# Patient Record
Sex: FEMALE | Race: WHITE | NOT HISPANIC OR LATINO | URBAN - METROPOLITAN AREA
[De-identification: names, ages, dates, MRNs, and addresses within clinical notes are randomized per-mention and may not be internally consistent; named-entity substitution may affect disease eponyms.]

---

## 2021-12-03 ENCOUNTER — WEB ENCOUNTER (OUTPATIENT)
Dept: URBAN - METROPOLITAN AREA CLINIC 78 | Facility: CLINIC | Age: 86
End: 2021-12-03

## 2021-12-06 ENCOUNTER — TELEPHONE ENCOUNTER (OUTPATIENT)
Dept: URBAN - METROPOLITAN AREA CLINIC 78 | Facility: CLINIC | Age: 86
End: 2021-12-06

## 2021-12-10 ENCOUNTER — OUT OF OFFICE VISIT (OUTPATIENT)
Dept: URBAN - METROPOLITAN AREA MEDICAL CENTER 10 | Facility: MEDICAL CENTER | Age: 86
End: 2021-12-10
Payer: MEDICARE

## 2021-12-10 DIAGNOSIS — R13.19 CERVICAL DYSPHAGIA: ICD-10-CM

## 2021-12-10 DIAGNOSIS — N17.9 ACUTE INJURY OF KIDNEY: ICD-10-CM

## 2021-12-10 DIAGNOSIS — A41.9 BLOOD INFECTION: ICD-10-CM

## 2021-12-10 DIAGNOSIS — R11.0 AM NAUSEA: ICD-10-CM

## 2021-12-10 PROCEDURE — 99233 SBSQ HOSP IP/OBS HIGH 50: CPT | Performed by: INTERNAL MEDICINE

## 2021-12-11 ENCOUNTER — OUT OF OFFICE VISIT (OUTPATIENT)
Dept: URBAN - METROPOLITAN AREA MEDICAL CENTER 10 | Facility: MEDICAL CENTER | Age: 86
End: 2021-12-11
Payer: MEDICARE

## 2021-12-11 DIAGNOSIS — N17.9 ACUTE INJURY OF KIDNEY: ICD-10-CM

## 2021-12-11 DIAGNOSIS — A41.9 BLOOD INFECTION: ICD-10-CM

## 2021-12-11 DIAGNOSIS — R11.0 AM NAUSEA: ICD-10-CM

## 2021-12-11 DIAGNOSIS — R13.19 CERVICAL DYSPHAGIA: ICD-10-CM

## 2021-12-11 PROCEDURE — 99232 SBSQ HOSP IP/OBS MODERATE 35: CPT | Performed by: INTERNAL MEDICINE

## 2021-12-12 ENCOUNTER — WEB ENCOUNTER (OUTPATIENT)
Dept: URBAN - METROPOLITAN AREA CLINIC 78 | Facility: CLINIC | Age: 86
End: 2021-12-12

## 2021-12-13 ENCOUNTER — OUT OF OFFICE VISIT (OUTPATIENT)
Dept: URBAN - METROPOLITAN AREA MEDICAL CENTER 10 | Facility: MEDICAL CENTER | Age: 86
End: 2021-12-13
Payer: MEDICARE

## 2021-12-13 DIAGNOSIS — K21.9 ACID REFLUX: ICD-10-CM

## 2021-12-13 DIAGNOSIS — J96.01 ACUTE RESPIRATORY FAILURE WITH HYPOXIA: ICD-10-CM

## 2021-12-13 DIAGNOSIS — R13.19 CERVICAL DYSPHAGIA: ICD-10-CM

## 2021-12-13 DIAGNOSIS — R11.0 AM NAUSEA: ICD-10-CM

## 2021-12-13 PROCEDURE — 99232 SBSQ HOSP IP/OBS MODERATE 35: CPT | Performed by: INTERNAL MEDICINE

## 2021-12-17 ENCOUNTER — OFFICE VISIT (OUTPATIENT)
Dept: URBAN - METROPOLITAN AREA CLINIC 78 | Facility: CLINIC | Age: 86
End: 2021-12-17

## 2021-12-20 ENCOUNTER — TELEPHONE ENCOUNTER (OUTPATIENT)
Dept: URBAN - METROPOLITAN AREA CLINIC 78 | Facility: CLINIC | Age: 86
End: 2021-12-20

## 2022-01-31 ENCOUNTER — OFFICE VISIT (OUTPATIENT)
Dept: URBAN - METROPOLITAN AREA CLINIC 78 | Facility: CLINIC | Age: 87
End: 2022-01-31
Payer: MEDICARE

## 2022-01-31 VITALS
TEMPERATURE: 97.1 F | BODY MASS INDEX: 20.7 KG/M2 | HEART RATE: 72 BPM | HEIGHT: 63 IN | SYSTOLIC BLOOD PRESSURE: 147 MMHG | DIASTOLIC BLOOD PRESSURE: 76 MMHG | WEIGHT: 116.8 LBS

## 2022-01-31 DIAGNOSIS — R10.9 ABDOMINAL PAIN: ICD-10-CM

## 2022-01-31 DIAGNOSIS — R53.83 FATIGUE, UNSPECIFIED TYPE: ICD-10-CM

## 2022-01-31 DIAGNOSIS — R43.2 DYSGEUSIA: ICD-10-CM

## 2022-01-31 DIAGNOSIS — R13.12 TRANSFER DYSPHAGIA: ICD-10-CM

## 2022-01-31 DIAGNOSIS — K59.01 CONSTIPATION: ICD-10-CM

## 2022-01-31 DIAGNOSIS — R11.0 NAUSEA: ICD-10-CM

## 2022-01-31 DIAGNOSIS — K27.9 PUD (PEPTIC ULCER DISEASE): ICD-10-CM

## 2022-01-31 DIAGNOSIS — R12 HEARTBURN: ICD-10-CM

## 2022-01-31 PROCEDURE — 99214 OFFICE O/P EST MOD 30 MIN: CPT | Performed by: INTERNAL MEDICINE

## 2022-01-31 RX ORDER — NALOXEGOL OXALATE 25 MG/1
TAKE 1 TABLET (25 MG) BY ORAL ROUTE ONCE DAILY IN THE MORNING TABLET, FILM COATED ORAL 1
Qty: 30 | Refills: 2 | Status: ON HOLD | COMMUNITY
Start: 2019-09-12

## 2022-01-31 RX ORDER — ISOSORBIDE MONONITRATE 30 MG/1
1 TABLET IN THE MORNING TABLET, EXTENDED RELEASE ORAL ONCE A DAY
Status: ACTIVE | COMMUNITY

## 2022-01-31 RX ORDER — CYANOCOBALAMIN (VITAMIN B-12) 500 MCG
1 TABLET TABLET ORAL ONCE A DAY
Status: ACTIVE | COMMUNITY

## 2022-01-31 RX ORDER — DICLOFENAC SODIUM TOPICAL GEL, 1% 10 MG/G
AS DIRECTED GEL TOPICAL
Status: ACTIVE | COMMUNITY

## 2022-01-31 RX ORDER — STANDARDIZED SENNA CONCENTRATE 8.6 MG/1
TAKE 2 TABLETS BY ORAL ROUTE ONCE DAILY TABLET ORAL 1
Qty: 120 | Refills: 3 | Status: ON HOLD | COMMUNITY
Start: 2019-10-25

## 2022-01-31 RX ORDER — PANTOPRAZOLE SODIUM 40 MG/1
1 TABLET TABLET, DELAYED RELEASE ORAL ONCE A DAY
Status: ACTIVE | COMMUNITY

## 2022-01-31 RX ORDER — SACCHAROMYCES BOULARDII 50 MG
1 CAPSULE CAPSULE ORAL TWICE A DAY
Status: ACTIVE | COMMUNITY

## 2022-01-31 RX ORDER — AMLODIPINE BESYLATE 10 MG/1
1 TABLET TABLET ORAL ONCE A DAY
Status: ACTIVE | COMMUNITY

## 2022-01-31 RX ORDER — OXYCODONE HYDROCHLORIDE 5 MG/1
1 TABLET AS NEEDED TABLET ORAL
Status: ACTIVE | COMMUNITY

## 2022-01-31 RX ORDER — ESTRADIOL 0.1 MG/G
AS DIRECTED CREAM VAGINAL
Status: ACTIVE | COMMUNITY

## 2022-01-31 RX ORDER — OMEPRAZOLE 40 MG/1
TAKE 1 CAPSULE (40 MG) BY ORAL ROUTE TWICE DAILY 30 MINUTES BEFORE BREAKFAST AND AGAIN 30 MINUTES BEFORE DINNER CAPSULE, DELAYED RELEASE PELLETS ORAL 1
Qty: 60 | Refills: 4 | Status: ON HOLD | COMMUNITY
Start: 2018-08-17

## 2022-01-31 RX ORDER — PIOGLITAZONE HYDROCHLORIDE 15 MG/1
1 TABLET TABLET ORAL ONCE A DAY
Status: ACTIVE | COMMUNITY

## 2022-01-31 RX ORDER — PROMETHAZINE HYDROCHLORIDE 25 MG/1
TAKE 1 TABLET PO Q 6 HOURS AS NEEDED FOR NAUSEA TABLET ORAL
Qty: 28 | Refills: 3 | Status: ON HOLD | COMMUNITY
Start: 2019-10-25

## 2022-01-31 RX ORDER — METHYLNALTREXONE BROMIDE 150 MG/1
TAKE 3 TABLETS (450 MG) BY ORAL ROUTE ONCE DAILY IN THE MORNING TABLET ORAL 1
Qty: 90 | Refills: 1 | Status: ON HOLD | COMMUNITY
Start: 2019-08-21

## 2022-01-31 RX ORDER — ASPIRIN 81 MG/1
TAKE 1 TABLET (81 MG) BY ORAL ROUTE ONCE DAILY TABLET, COATED ORAL 1
Qty: 0 | Refills: 0 | Status: ON HOLD | COMMUNITY
Start: 1900-01-01

## 2022-01-31 RX ORDER — LEVOTHYROXINE SODIUM 75 UG/1
1 TABLET IN THE MORNING ON AN EMPTY STOMACH TABLET ORAL ONCE A DAY
Status: ACTIVE | COMMUNITY

## 2022-01-31 NOTE — HPI-TODAY'S VISIT:
The patient presents for a gastroenterology evaluation following a recent inpt hospital stay. A copy of this note will be sent to the referring physician.  She was recently hospitalized in Dec 2021 for intractable nausea for weeks, BRIGITTE, sepsis on Meropenem and acute respiratory failure with possible pneumonia. She was noted to have transfer dysphagia with evidence of silent aspiration with all consistencies, Dobhoff was placed to administer TFs. She also has chronic back pain/R hip pain radiating to RLQ, thrombocytopenia, GERD, NIDDM, CKD 3, Carotid artery disease, HTN and HLD  A conservartive approach was taken from a GI standpoint. I started her on Scopolamine patch and provided time until all other acute issues imrpoved. She has in fact done better.   She tells me today she has been compliant with using the  PPI BID. Her son wonders if the Pantoprazole could be contributing to some of her ongoing nausea and lightheadedness. She was taking Zofran 8mg  SL; her ENT stopped the Meclizine. She is instead taking a sip of the Children's Benadryl for such.  She drinks OJ every day.  She is not using the Scopolamine or Reglan anymore.  Her appetite has been slowly improving. She reports some dysgeusia. She states her food tastes like medicine. Her son also started her recently on a MVI.  She is using a stool softener and Miralax once a day. She is having a soft, formed BM daily.   She still complains of intermittent, moderate abdominal pain on the RLQ, which is likely related to her R hip pain.  She is doing PT about twice a week.   She was seen by Dr. Phipps a couple of weeks ago and had labs.  She was noted to have markedly low TSH levels. Her thyroxine drug was adjusted.   She is still having constipation. She is now on Norco 5/325 (as opposed to 10/325) on a prn basis for severe back pain.  The patient underwent EGD with dilation by Dr. Lino Rod in 2011. UGI 12/15 for similar complains showed an irregular narrowing at GE junction (?stricture), a small sliding HH and diffuse tertiary esophageal contractions. She reports a remote history of PUD (DU).   Her  passed away from colon cancer. Her son, who is in his 60's has refused to have a colonoscopy done.  She has had 3 back surgeries by Dr. Amanda Pugh. She also has LE neuropathy.  She is accompanied by her son today.   Summary of prior workup:  - Labs on 1/18/22: sodium 138, potassium 4.5, glucose 114, BUN 36, creatinine 1.27, total protein 7.2, albumin 4.2, total bilirubin 0.6, ALT 8, AST 15, alkaline phosphatase 58. TSH 0.08. Free thyroxine 1.81. White blood cell count 9.2, hemoglobin 12.7, MCV 93, platelets 240. - Chest x-ray, portable view, done on admission showing small left pleural effusion with associated atelectasis with no dense focal airspace opacity. - KUB on 12/06/2021, showing nonspecific nonobstructive bowel gas pattern. - Bilateral renal ultrasound with the bladder done on 12/06 showingldly increased renal echogenicity with no hydronephrosis. - CT scan of the abdomen and pelvis without IV contrast done on 12/06 showing gallbladder distended with small amount of sludge with no stones or gross wall thickening.  New small bilateral pleural effusion with patchy infiltrate or atelectasis at the lung bases with no focal fluid collection in the abdomen and pelvis. - CXR on 12/06/2021: new bilateral interstitial opacities favoring interstitial mild pulmonary edema or atypical infection with stable trace pleural effusion bilaterally and atelectasis. - CT scan of abdomen without IV contrast  on 12/08: no  acute abnormality with stable asymmetric atrophy of the left kidney and no hydronephrosis. 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 - MRI of the right hip without contrast on 12/09 showing right gluteus  and minimus insertion tendinopathy with mild edema along the abductor brevis, abductor longus, abductor externus muscle that is nonspecific and probably reflecting mild muscle strain.  - CT A/P 10/17/19: Very subtle wall thickening at the level of the cecum with minimal pericolonic inflammatory change. Short segment colitis or uncomplicated diverticulitis given adjacent diverticulum. No surrounding fluid or air. No hydronephrosis, hydroureter, or renal calculi. No bowel obstruction or dilatation.  - CT abdomen and pelvis w/o contrast on 10/25/18 revealed fecal retention without intestinal obstruction. No - CT evidence of appendicitis. Cystic structure in the right kidney which likely represents a cyst but incompletely characterized. Shrunken left kidney. No urinary obstruction. Small hiatal hernia with evidence of gastroesophageal reflux. No pericardial effusion. Normal liver, spleen, pancreas, adrenal glands and intestines. No large colonic mass. Diverticulosis.  - Colonoscopy on 8/29/18 showed a 4 mm ascending colon TA and sigm diverticulosis; otherwise normal TI and colon mucosa. - E/C by me on 7/12/18 revealed a tortuous esoph with Grade B reflux esophagitis, mild gastritis and a normal duodenum. Her colonoscopy had to be aborted as there was solid stool in the rectum. - Pelvic US on 7/3/18: Status post total hysterectomy. No obvious pelvic or adnexal mass on limited transabdominalevaluation.  - Abd US on 7/3/18: Mildly heterogeneous hepatic echogenicity, consistent with nonspecific hepatic parenchymal changes, including hepatic steatosis. Increased renal echogenicity, consistent with diffuse renal parenchymal disease, with asymmetric left renal cortical atrophy. No hydronephrosis. No sonographic evidence for cholelithiasis or acute cholecystitis.  - Labs on 7/3/18 showed a normal CBC, LFT's, TSH. Vit D level.  - Pharmacologic nuclear stress test in Feb 2018: Perfusion: Small, mild, paradoxic apical defect. This likely represents artifact. Systolic Function: Normal left ventricular systolic function and wall motion with an EF of 79%. Appropriate Use Criteria for Revascularization: Low-risk

## 2022-02-10 ENCOUNTER — TELEPHONE ENCOUNTER (OUTPATIENT)
Dept: URBAN - METROPOLITAN AREA CLINIC 78 | Facility: CLINIC | Age: 87
End: 2022-02-10

## 2022-02-15 ENCOUNTER — TELEPHONE ENCOUNTER (OUTPATIENT)
Dept: URBAN - METROPOLITAN AREA CLINIC 78 | Facility: CLINIC | Age: 87
End: 2022-02-15

## 2022-02-15 RX ORDER — PANTOPRAZOLE SODIUM 40 MG/1
1 TABLET TABLET, DELAYED RELEASE ORAL ONCE A DAY
Qty: 90 TABLET | Refills: 1 | OUTPATIENT
Start: 2022-02-15

## 2022-02-22 ENCOUNTER — TELEPHONE ENCOUNTER (OUTPATIENT)
Dept: URBAN - METROPOLITAN AREA CLINIC 78 | Facility: CLINIC | Age: 87
End: 2022-02-22

## 2022-02-22 RX ORDER — PANTOPRAZOLE SODIUM 40 MG/1
1 TABLET TABLET, DELAYED RELEASE ORAL ONCE A DAY
Qty: 90 TABLET | Refills: 1 | OUTPATIENT
Start: 2022-02-22

## 2022-03-07 ENCOUNTER — OFFICE VISIT (OUTPATIENT)
Dept: URBAN - METROPOLITAN AREA CLINIC 78 | Facility: CLINIC | Age: 87
End: 2022-03-07
Payer: MEDICARE

## 2022-03-07 VITALS
DIASTOLIC BLOOD PRESSURE: 74 MMHG | HEIGHT: 63 IN | TEMPERATURE: 96.8 F | SYSTOLIC BLOOD PRESSURE: 176 MMHG | HEART RATE: 58 BPM

## 2022-03-07 DIAGNOSIS — R43.2 DYSGEUSIA: ICD-10-CM

## 2022-03-07 DIAGNOSIS — K27.9 PUD (PEPTIC ULCER DISEASE): ICD-10-CM

## 2022-03-07 DIAGNOSIS — R10.9 ABDOMINAL PAIN: ICD-10-CM

## 2022-03-07 DIAGNOSIS — R13.12 TRANSFER DYSPHAGIA: ICD-10-CM

## 2022-03-07 DIAGNOSIS — R12 HEARTBURN: ICD-10-CM

## 2022-03-07 DIAGNOSIS — R53.83 FATIGUE, UNSPECIFIED TYPE: ICD-10-CM

## 2022-03-07 DIAGNOSIS — R11.0 NAUSEA: ICD-10-CM

## 2022-03-07 DIAGNOSIS — K59.01 CONSTIPATION: ICD-10-CM

## 2022-03-07 PROCEDURE — 99214 OFFICE O/P EST MOD 30 MIN: CPT | Performed by: INTERNAL MEDICINE

## 2022-03-07 RX ORDER — DICLOFENAC SODIUM TOPICAL GEL, 1% 10 MG/G
AS DIRECTED GEL TOPICAL
Status: ACTIVE | COMMUNITY

## 2022-03-07 RX ORDER — AMLODIPINE BESYLATE 10 MG/1
1 TABLET TABLET ORAL ONCE A DAY
Status: ACTIVE | COMMUNITY

## 2022-03-07 RX ORDER — LEVOTHYROXINE SODIUM 75 UG/1
1 TABLET IN THE MORNING ON AN EMPTY STOMACH TABLET ORAL ONCE A DAY
Status: ACTIVE | COMMUNITY

## 2022-03-07 RX ORDER — PANTOPRAZOLE SODIUM 40 MG/1
1 TABLET TABLET, DELAYED RELEASE ORAL ONCE A DAY
Qty: 90 TABLET | Refills: 1 | Status: ACTIVE | COMMUNITY
Start: 2022-02-22

## 2022-03-07 RX ORDER — ONDANSETRON 8 MG/1
1 TABLET ON THE TONGUE AND ALLOW TO DISSOLVE  AS NEEDED TABLET, ORALLY DISINTEGRATING ORAL
Qty: 60 | Refills: 2 | OUTPATIENT

## 2022-03-07 RX ORDER — PROMETHAZINE HYDROCHLORIDE 25 MG/1
TAKE 1 TABLET PO Q 6 HOURS AS NEEDED FOR NAUSEA TABLET ORAL
Qty: 28 | Refills: 3 | Status: DISCONTINUED | COMMUNITY
Start: 2019-10-25

## 2022-03-07 RX ORDER — ESTRADIOL 0.1 MG/G
AS DIRECTED CREAM VAGINAL
Status: ACTIVE | COMMUNITY

## 2022-03-07 RX ORDER — METHYLNALTREXONE BROMIDE 150 MG/1
TAKE 3 TABLETS (450 MG) BY ORAL ROUTE ONCE DAILY IN THE MORNING TABLET ORAL 1
Qty: 90 | Refills: 1 | Status: DISCONTINUED | COMMUNITY
Start: 2019-08-21

## 2022-03-07 RX ORDER — ASPIRIN 81 MG/1
TAKE 1 TABLET (81 MG) BY ORAL ROUTE ONCE DAILY TABLET, COATED ORAL 1
Qty: 0 | Refills: 0 | Status: DISCONTINUED | COMMUNITY
Start: 1900-01-01

## 2022-03-07 RX ORDER — PANTOPRAZOLE SODIUM 40 MG/1
1 TABLET TABLET, DELAYED RELEASE ORAL ONCE A DAY
Qty: 90 TABLET | Refills: 1 | Status: ACTIVE | COMMUNITY
Start: 2022-02-15

## 2022-03-07 RX ORDER — NALOXEGOL OXALATE 25 MG/1
TAKE 1 TABLET (25 MG) BY ORAL ROUTE ONCE DAILY IN THE MORNING TABLET, FILM COATED ORAL 1
Qty: 30 | Refills: 2 | Status: DISCONTINUED | COMMUNITY
Start: 2019-09-12

## 2022-03-07 RX ORDER — OXYCODONE HYDROCHLORIDE 5 MG/1
1 TABLET AS NEEDED TABLET ORAL
Status: ACTIVE | COMMUNITY

## 2022-03-07 RX ORDER — PANTOPRAZOLE SODIUM 40 MG/1
1 TABLET TABLET, DELAYED RELEASE ORAL ONCE A DAY
Qty: 90 TABLET | Refills: 2

## 2022-03-07 RX ORDER — STANDARDIZED SENNA CONCENTRATE 8.6 MG/1
TAKE 2 TABLETS BY ORAL ROUTE ONCE DAILY TABLET ORAL 1
Qty: 120 | Refills: 3 | Status: DISCONTINUED | COMMUNITY
Start: 2019-10-25

## 2022-03-07 RX ORDER — PIOGLITAZONE HYDROCHLORIDE 15 MG/1
1 TABLET TABLET ORAL ONCE A DAY
Status: ACTIVE | COMMUNITY

## 2022-03-07 RX ORDER — PANTOPRAZOLE SODIUM 40 MG/1
1 TABLET TABLET, DELAYED RELEASE ORAL ONCE A DAY
Status: ACTIVE | COMMUNITY

## 2022-03-07 RX ORDER — CYANOCOBALAMIN (VITAMIN B-12) 500 MCG
1 TABLET TABLET ORAL ONCE A DAY
Status: ACTIVE | COMMUNITY

## 2022-03-07 RX ORDER — ISOSORBIDE MONONITRATE 30 MG/1
1 TABLET IN THE MORNING TABLET, EXTENDED RELEASE ORAL ONCE A DAY
Status: ACTIVE | COMMUNITY

## 2022-03-07 RX ORDER — SACCHAROMYCES BOULARDII 50 MG
1 CAPSULE CAPSULE ORAL TWICE A DAY
Status: ACTIVE | COMMUNITY

## 2022-03-07 RX ORDER — OMEPRAZOLE 40 MG/1
TAKE 1 CAPSULE (40 MG) BY ORAL ROUTE TWICE DAILY 30 MINUTES BEFORE BREAKFAST AND AGAIN 30 MINUTES BEFORE DINNER CAPSULE, DELAYED RELEASE PELLETS ORAL 1
Qty: 60 | Refills: 4 | Status: DISCONTINUED | COMMUNITY
Start: 2018-08-17

## 2022-03-07 NOTE — HPI-TODAY'S VISIT:
The patient presents for a gastroenterology evaluation following a recent inpt hospital stay. A copy of this note will be sent to the referring physician.  She was recently hospitalized in Dec 2021 for intractable nausea for weeks, BRIGITTE, sepsis on Meropenem and acute respiratory failure with possible pneumonia. She was noted to have transfer dysphagia with evidence of silent aspiration with all consistencies, Dobhoff was placed to administer TFs. She also has chronic back pain/R hip pain radiating to RLQ, thrombocytopenia, GERD, NIDDM, CKD 3, Carotid artery disease, HTN and HLD  A conservartive approach was taken from a GI standpoint. I started her on Scopolamine patch and provided time until all other acute issues imrpoved. She has in fact done better.   She tells me today she has done quite well despite cutting back on the PPI to QD.  She was taking Zofran 8mg  SL; her ENT stopped the Meclizine. She is instead taking a sip of the Children's Benadryl for such.   She is not using the Scopolamine or Reglan anymore.  Her appetite has been slowly improving. She reports less dysgeusia than at her previous visit.  She is almost done with OT. She still needs additional PT.   When she gets nauseous and takes the Zofran she does have mildly increased burping.   She is using a stool softeners and Miralax once a day. She is having a soft, formed BM daily.   She still complains of intermittent, moderate abdominal pain on the RLQ, which is likely related to her R hip pain.  She is doing PT about twice a week.   She was seen by Dr. Phipps a couple of weeks ago and had labs.  She was noted to have markedly low TSH levels. Her thyroxine drug was adjusted.   She is still having constipation. She is taking the Miralax and having a BM almost daily.   The patient underwent EGD with dilation by Dr. iLno Rod in 2011. UGI 12/15 for similar complains showed an irregular narrowing at GE junction (?stricture), a small sliding HH and diffuse tertiary esophageal contractions. She reports a remote history of PUD (DU).  She has good days and bad days. Some days she has severe peripheral neuropathy.   She has been taking 2 Florastor 2/day. They are wondering whether she can cut back to once day.  Her  passed away from colon cancer. Her son, who is in his 60's has refused to have a colonoscopy done.  She has had 3 back surgeries by Dr. Amanda Pugh. She also has LE neuropathy.  She is accompanied by her son today.   Summary of prior workup:  - Labs on 1/18/22: sodium 138, potassium 4.5, glucose 114, BUN 36, creatinine 1.27, total protein 7.2, albumin 4.2, total bilirubin 0.6, ALT 8, AST 15, alkaline phosphatase 58. TSH 0.08. Free thyroxine 1.81. White blood cell count 9.2, hemoglobin 12.7, MCV 93, platelets 240. - Chest x-ray, portable view, done on admission showing small left pleural effusion with associated atelectasis with no dense focal airspace opacity. - KUB on 12/06/2021, showing nonspecific nonobstructive bowel gas pattern. - Bilateral renal ultrasound with the bladder done on 12/06 showingldly increased renal echogenicity with no hydronephrosis. - CT scan of the abdomen and pelvis without IV contrast done on 12/06 showing gallbladder distended with small amount of sludge with no stones or gross wall thickening.  New small bilateral pleural effusion with patchy infiltrate or atelectasis at the lung bases with no focal fluid collection in the abdomen and pelvis. - CXR on 12/06/2021: new bilateral interstitial opacities favoring interstitial mild pulmonary edema or atypical infection with stable trace pleural effusion bilaterally and atelectasis. - CT scan of abdomen without IV contrast  on 12/08: no  acute abnormality with stable asymmetric atrophy of the left kidney and no hydronephrosis. 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 - MRI of the right hip without contrast on 12/09 showing right gluteus  and minimus insertion tendinopathy with mild edema along the abductor brevis, abductor longus, abductor externus muscle that is nonspecific and probably reflecting mild muscle strain.  - CT A/P 10/17/19: Very subtle wall thickening at the level of the cecum with minimal pericolonic inflammatory change. Short segment colitis or uncomplicated diverticulitis given adjacent diverticulum. No surrounding fluid or air. No hydronephrosis, hydroureter, or renal calculi. No bowel obstruction or dilatation.  - CT abdomen and pelvis w/o contrast on 10/25/18 revealed fecal retention without intestinal obstruction. No - CT evidence of appendicitis. Cystic structure in the right kidney which likely represents a cyst but incompletely characterized. Shrunken left kidney. No urinary obstruction. Small hiatal hernia with evidence of gastroesophageal reflux. No pericardial effusion. Normal liver, spleen, pancreas, adrenal glands and intestines. No large colonic mass. Diverticulosis.  - Colonoscopy on 8/29/18 showed a 4 mm ascending colon TA and sigm diverticulosis; otherwise normal TI and colon mucosa. - E/C by me on 7/12/18 revealed a tortuous esoph with Grade B reflux esophagitis, mild gastritis and a normal duodenum. Her colonoscopy had to be aborted as there was solid stool in the rectum. - Pelvic US on 7/3/18: Status post total hysterectomy. No obvious pelvic or adnexal mass on limited transabdominalevaluation.  - Abd US on 7/3/18: Mildly heterogeneous hepatic echogenicity, consistent with nonspecific hepatic parenchymal changes, including hepatic steatosis. Increased renal echogenicity, consistent with diffuse renal parenchymal disease, with asymmetric left renal cortical atrophy. No hydronephrosis. No sonographic evidence for cholelithiasis or acute cholecystitis.  - Labs on 7/3/18 showed a normal CBC, LFT's, TSH. Vit D level.  - Pharmacologic nuclear stress test in Feb 2018: Perfusion: Small, mild, paradoxic apical defect. This likely represents artifact. Systolic Function: Normal left ventricular systolic function and wall motion with an EF of 79%. Appropriate Use Criteria for Revascularization: Low-risk

## 2022-07-07 ENCOUNTER — OFFICE VISIT (OUTPATIENT)
Dept: URBAN - METROPOLITAN AREA CLINIC 78 | Facility: CLINIC | Age: 87
End: 2022-07-07
Payer: MEDICARE

## 2022-07-07 VITALS
DIASTOLIC BLOOD PRESSURE: 82 MMHG | HEIGHT: 63 IN | TEMPERATURE: 97.8 F | HEART RATE: 81 BPM | SYSTOLIC BLOOD PRESSURE: 168 MMHG

## 2022-07-07 DIAGNOSIS — K27.9 PUD (PEPTIC ULCER DISEASE): ICD-10-CM

## 2022-07-07 DIAGNOSIS — K59.01 CONSTIPATION: ICD-10-CM

## 2022-07-07 DIAGNOSIS — R13.12 TRANSFER DYSPHAGIA: ICD-10-CM

## 2022-07-07 DIAGNOSIS — R43.2 DYSGEUSIA: ICD-10-CM

## 2022-07-07 DIAGNOSIS — R11.0 NAUSEA: ICD-10-CM

## 2022-07-07 DIAGNOSIS — R10.9 ABDOMINAL PAIN: ICD-10-CM

## 2022-07-07 DIAGNOSIS — R53.83 FATIGUE, UNSPECIFIED TYPE: ICD-10-CM

## 2022-07-07 DIAGNOSIS — R23.2 HOT FLASHES: ICD-10-CM

## 2022-07-07 DIAGNOSIS — R12 HEARTBURN: ICD-10-CM

## 2022-07-07 PROCEDURE — 99214 OFFICE O/P EST MOD 30 MIN: CPT | Performed by: INTERNAL MEDICINE

## 2022-07-07 RX ORDER — BUPRENORPHINE 7.5 UG/H
PATCH, EXTENDED RELEASE TRANSDERMAL
Qty: 4 PATCH | Status: ACTIVE | COMMUNITY

## 2022-07-07 RX ORDER — MIRABEGRON 50 MG/1
TABLET, FILM COATED, EXTENDED RELEASE ORAL
Qty: 90 TABLET | Status: ACTIVE | COMMUNITY

## 2022-07-07 RX ORDER — DICLOFENAC SODIUM TOPICAL GEL, 1% 10 MG/G
AS DIRECTED GEL TOPICAL
Status: ACTIVE | COMMUNITY

## 2022-07-07 RX ORDER — DOXYCYCLINE HYCLATE 100 MG/1
CAPSULE ORAL
Qty: 60 CAPSULE | Status: ACTIVE | COMMUNITY

## 2022-07-07 RX ORDER — PANTOPRAZOLE SODIUM 40 MG/1
1 TABLET TABLET, DELAYED RELEASE ORAL ONCE A DAY
Qty: 90 TABLET | Refills: 2 | Status: ACTIVE | COMMUNITY

## 2022-07-07 RX ORDER — ONDANSETRON 8 MG/1
1 TABLET ON THE TONGUE AND ALLOW TO DISSOLVE  AS NEEDED TABLET, ORALLY DISINTEGRATING ORAL
Qty: 60 | Refills: 2 | Status: ACTIVE | COMMUNITY

## 2022-07-07 RX ORDER — LEVOTHYROXINE SODIUM 75 UG/1
1 TABLET IN THE MORNING ON AN EMPTY STOMACH TABLET ORAL ONCE A DAY
Status: ACTIVE | COMMUNITY

## 2022-07-07 RX ORDER — ONDANSETRON 8 MG/1
1 TABLET ON THE TONGUE AND ALLOW TO DISSOLVE  AS NEEDED TABLET, ORALLY DISINTEGRATING ORAL
Qty: 60 | Refills: 2 | OUTPATIENT

## 2022-07-07 RX ORDER — TRETINOIN 1 MG/G
CREAM TOPICAL
Qty: 45 GRAM | Status: ACTIVE | COMMUNITY

## 2022-07-07 RX ORDER — SACCHAROMYCES BOULARDII 50 MG
1 CAPSULE CAPSULE ORAL TWICE A DAY
Status: ACTIVE | COMMUNITY

## 2022-07-07 RX ORDER — AMLODIPINE BESYLATE 10 MG/1
1 TABLET TABLET ORAL ONCE A DAY
Status: ACTIVE | COMMUNITY

## 2022-07-07 RX ORDER — CALCIPOTRIENE 0.05 MG/ML
SOLUTION TOPICAL
Qty: 60 UNSPECIFIED | Status: ACTIVE | COMMUNITY

## 2022-07-07 RX ORDER — CYANOCOBALAMIN (VITAMIN B-12) 500 MCG
1 TABLET TABLET ORAL ONCE A DAY
Status: ACTIVE | COMMUNITY

## 2022-07-07 RX ORDER — OXYCODONE HYDROCHLORIDE 5 MG/1
1 TABLET AS NEEDED TABLET ORAL
Status: ACTIVE | COMMUNITY

## 2022-07-07 RX ORDER — METOPROLOL SUCCINATE 25 MG/1
TABLET, EXTENDED RELEASE ORAL
Qty: 90 TABLET | Status: ACTIVE | COMMUNITY

## 2022-07-07 RX ORDER — ESTRADIOL 0.1 MG/G
AS DIRECTED CREAM VAGINAL
Status: ACTIVE | COMMUNITY

## 2022-07-07 RX ORDER — ATORVASTATIN CALCIUM 20 MG/1
TABLET, FILM COATED ORAL
Qty: 90 TABLET | Status: ACTIVE | COMMUNITY

## 2022-07-07 RX ORDER — ISOSORBIDE MONONITRATE 30 MG/1
1 TABLET IN THE MORNING TABLET, EXTENDED RELEASE ORAL ONCE A DAY
Status: ACTIVE | COMMUNITY

## 2022-07-07 RX ORDER — LEVOTHYROXINE SODIUM 100 UG/1
TABLET ORAL
Qty: 90 TABLET | Status: ACTIVE | COMMUNITY

## 2022-07-07 RX ORDER — PIOGLITAZONE HYDROCHLORIDE 15 MG/1
1 TABLET TABLET ORAL ONCE A DAY
Status: ACTIVE | COMMUNITY

## 2022-07-07 RX ORDER — OMEGA-3-ACID ETHYL ESTERS 1 G/1
CAPSULE, LIQUID FILLED ORAL
Qty: 360 CAPSULE | Status: ACTIVE | COMMUNITY

## 2022-07-07 RX ORDER — LEVOTHYROXINE SODIUM 88 UG/1
TABLET ORAL
Qty: 90 TABLET | Status: ACTIVE | COMMUNITY

## 2022-07-07 RX ORDER — KETOCONAZOLE 20 MG/ML
SHAMPOO, SUSPENSION TOPICAL
Qty: 120 UNSPECIFIED | Status: ACTIVE | COMMUNITY

## 2022-07-07 RX ORDER — FLUOCINONIDE 0.5 MG/ML
SOLUTION TOPICAL
Qty: 60 UNSPECIFIED | Status: ACTIVE | COMMUNITY

## 2022-07-07 NOTE — HPI-TODAY'S VISIT:
The patient presents for a gastroenterology evaluation following a recent inpt hospital stay. A copy of this note will be sent to the referring physician.  She was recently hospitalized in Dec 2021 for intractable nausea for weeks, BRIGITTE, sepsis on Meropenem and acute respiratory failure with possible pneumonia. She was noted to have transfer dysphagia with evidence of silent aspiration with all consistencies, Dobhoff was placed to administer TFs. She also has chronic back pain/R hip pain radiating to RLQ, thrombocytopenia, GERD, NIDDM, CKD 3, Carotid artery disease, HTN and HLD  A conservartive approach was taken from a GI standpoint. I started her on Scopolamine patch and provided time until all other acute issues imrpoved. She has in fact done better.  She is not using the Scopolamine or Reglan anymore.   She has been havin hot flashes, for whcih reason Dr. Ramy Acosta switched her to Levoxyl 100mcg daily. The hot flashes have gotten a bit better. The nausea is not as bad as  it was before. She might experience the nausea sporadically now, especially if she doesn't take her meds with foods.  The RLQ pain has gotten a bit better, but she still experiences it here and there. She is to get an injection in her SI joint next week. She is using the Miralax daily as well as Docusate QOD, which has been helping with her constipation. She has a lot of pain in her legs related to her diabetic neuropathy. She is on a B complex cocktail. She has been compliant with using the Protonix daily. Her appetite has been slowly improving. She reports less dysgeusia  She does not need new prescritpions right now.   The patient underwent EGD with dilation by Dr. Lino Rod in 2011. UGI 12/15 for similar complains showed an irregular narrowing at GE junction (?stricture), a small sliding HH and diffuse tertiary esophageal contractions. She reports a remote history of PUD (DU).  She has good days and bad days. Some days she has severe peripheral neuropathy.   She has been taking 2 Florastor 2/day. They are wondering whether she can cut back to once day.  Her  passed away from colon cancer. Her son, who is in his 60's has refused to have a colonoscopy done.  She has had 3 back surgeries by Dr. Amanda Pugh.   She is accompanied by her son today.   Summary of prior workup:  - Labs on 1/18/22: sodium 138, potassium 4.5, glucose 114, BUN 36, creatinine 1.27, total protein 7.2, albumin 4.2, total bilirubin 0.6, ALT 8, AST 15, alkaline phosphatase 58. TSH 0.08. Free thyroxine 1.81. White blood cell count 9.2, hemoglobin 12.7, MCV 93, platelets 240. - Chest x-ray, portable view, done on admission showing small left pleural effusion with associated atelectasis with no dense focal airspace opacity. - KUB on 12/06/2021, showing nonspecific nonobstructive bowel gas pattern. - Bilateral renal ultrasound with the bladder done on 12/06 showingldly increased renal echogenicity with no hydronephrosis. - CT scan of the abdomen and pelvis without IV contrast done on 12/06 showing gallbladder distended with small amount of sludge with no stones or gross wall thickening.  New small bilateral pleural effusion with patchy infiltrate or atelectasis at the lung bases with no focal fluid collection in the abdomen and pelvis. - CXR on 12/06/2021: new bilateral interstitial opacities favoring interstitial mild pulmonary edema or atypical infection with stable trace pleural effusion bilaterally and atelectasis. - CT scan of abdomen without IV contrast  on 12/08: no  acute abnormality with stable asymmetric atrophy of the left kidney and no hydronephrosis. 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 - MRI of the right hip without contrast on 12/09 showing right gluteus  and minimus insertion tendinopathy with mild edema along the abductor brevis, abductor longus, abductor externus muscle that is nonspecific and probably reflecting mild muscle strain.  - CT A/P 10/17/19: Very subtle wall thickening at the level of the cecum with minimal pericolonic inflammatory change. Short segment colitis or uncomplicated diverticulitis given adjacent diverticulum. No surrounding fluid or air. No hydronephrosis, hydroureter, or renal calculi. No bowel obstruction or dilatation.  - CT abdomen and pelvis w/o contrast on 10/25/18 revealed fecal retention without intestinal obstruction. No - CT evidence of appendicitis. Cystic structure in the right kidney which likely represents a cyst but incompletely characterized. Shrunken left kidney. No urinary obstruction. Small hiatal hernia with evidence of gastroesophageal reflux. No pericardial effusion. Normal liver, spleen, pancreas, adrenal glands and intestines. No large colonic mass. Diverticulosis.  - Colonoscopy on 8/29/18 showed a 4 mm ascending colon TA and sigm diverticulosis; otherwise normal TI and colon mucosa. - E/C by me on 7/12/18 revealed a tortuous esoph with Grade B reflux esophagitis, mild gastritis and a normal duodenum. Her colonoscopy had to be aborted as there was solid stool in the rectum. - Pelvic US on 7/3/18: Status post total hysterectomy. No obvious pelvic or adnexal mass on limited transabdominalevaluation.  - Abd US on 7/3/18: Mildly heterogeneous hepatic echogenicity, consistent with nonspecific hepatic parenchymal changes, including hepatic steatosis. Increased renal echogenicity, consistent with diffuse renal parenchymal disease, with asymmetric left renal cortical atrophy. No hydronephrosis. No sonographic evidence for cholelithiasis or acute cholecystitis.  - Labs on 7/3/18 showed a normal CBC, LFT's, TSH. Vit D level.  - Pharmacologic nuclear stress test in Feb 2018: Perfusion: Small, mild, paradoxic apical defect. This likely represents artifact. Systolic Function: Normal left ventricular systolic function and wall motion with an EF of 79%. Appropriate Use Criteria for Revascularization: Low-risk

## 2022-11-04 ENCOUNTER — WEB ENCOUNTER (OUTPATIENT)
Dept: URBAN - METROPOLITAN AREA CLINIC 78 | Facility: CLINIC | Age: 87
End: 2022-11-04

## 2022-11-10 ENCOUNTER — OFFICE VISIT (OUTPATIENT)
Dept: URBAN - METROPOLITAN AREA CLINIC 78 | Facility: CLINIC | Age: 87
End: 2022-11-10
Payer: MEDICARE

## 2022-11-10 VITALS
BODY MASS INDEX: 21.26 KG/M2 | HEIGHT: 63 IN | HEART RATE: 59 BPM | TEMPERATURE: 97.8 F | WEIGHT: 120 LBS | SYSTOLIC BLOOD PRESSURE: 174 MMHG | DIASTOLIC BLOOD PRESSURE: 75 MMHG

## 2022-11-10 DIAGNOSIS — K59.01 CONSTIPATION: ICD-10-CM

## 2022-11-10 DIAGNOSIS — R11.0 NAUSEA: ICD-10-CM

## 2022-11-10 DIAGNOSIS — R13.12 TRANSFER DYSPHAGIA: ICD-10-CM

## 2022-11-10 DIAGNOSIS — R12 HEARTBURN: ICD-10-CM

## 2022-11-10 DIAGNOSIS — R10.9 ABDOMINAL PAIN: ICD-10-CM

## 2022-11-10 DIAGNOSIS — R53.83 FATIGUE, UNSPECIFIED TYPE: ICD-10-CM

## 2022-11-10 DIAGNOSIS — R23.2 HOT FLASHES: ICD-10-CM

## 2022-11-10 DIAGNOSIS — R43.2 DYSGEUSIA: ICD-10-CM

## 2022-11-10 DIAGNOSIS — K27.9 PUD (PEPTIC ULCER DISEASE): ICD-10-CM

## 2022-11-10 PROBLEM — 14760008 CONSTIPATION: Status: ACTIVE | Noted: 2022-01-31

## 2022-11-10 PROBLEM — 271801002: Status: ACTIVE | Noted: 2022-01-31

## 2022-11-10 PROBLEM — 13200003 PEPTIC ULCER DISEASE: Status: ACTIVE | Noted: 2022-01-31

## 2022-11-10 PROBLEM — 71457002: Status: ACTIVE | Noted: 2021-12-20

## 2022-11-10 PROCEDURE — 99214 OFFICE O/P EST MOD 30 MIN: CPT | Performed by: INTERNAL MEDICINE

## 2022-11-10 RX ORDER — DOXYCYCLINE HYCLATE 100 MG/1
CAPSULE ORAL
Qty: 60 CAPSULE | Status: ACTIVE | COMMUNITY

## 2022-11-10 RX ORDER — PIOGLITAZONE HYDROCHLORIDE 15 MG/1
1 TABLET TABLET ORAL ONCE A DAY
Status: ACTIVE | COMMUNITY

## 2022-11-10 RX ORDER — DICLOFENAC SODIUM TOPICAL GEL, 1% 10 MG/G
AS DIRECTED GEL TOPICAL
Status: ACTIVE | COMMUNITY

## 2022-11-10 RX ORDER — MIRABEGRON 50 MG/1
TABLET, FILM COATED, EXTENDED RELEASE ORAL
Qty: 90 TABLET | Status: ACTIVE | COMMUNITY

## 2022-11-10 RX ORDER — ONDANSETRON 8 MG/1
1 TABLET ON THE TONGUE AND ALLOW TO DISSOLVE  AS NEEDED TABLET, ORALLY DISINTEGRATING ORAL
Qty: 60 | Refills: 2 | Status: ACTIVE | COMMUNITY

## 2022-11-10 RX ORDER — CALCIPOTRIENE 0.05 MG/ML
SOLUTION TOPICAL
Qty: 60 UNSPECIFIED | Status: ACTIVE | COMMUNITY

## 2022-11-10 RX ORDER — SACCHAROMYCES BOULARDII 50 MG
1 CAPSULE CAPSULE ORAL TWICE A DAY
Status: ACTIVE | COMMUNITY

## 2022-11-10 RX ORDER — KETOCONAZOLE 20 MG/ML
SHAMPOO, SUSPENSION TOPICAL
Qty: 120 UNSPECIFIED | Status: ACTIVE | COMMUNITY

## 2022-11-10 RX ORDER — OXYCODONE HYDROCHLORIDE 5 MG/1
1 TABLET AS NEEDED TABLET ORAL
Status: ACTIVE | COMMUNITY

## 2022-11-10 RX ORDER — ATORVASTATIN CALCIUM 20 MG/1
TABLET, FILM COATED ORAL
Qty: 90 TABLET | Status: ACTIVE | COMMUNITY

## 2022-11-10 RX ORDER — LEVOTHYROXINE SODIUM 100 UG/1
TABLET ORAL
Qty: 90 TABLET | Status: ACTIVE | COMMUNITY

## 2022-11-10 RX ORDER — PANTOPRAZOLE SODIUM 40 MG/1
1 TABLET TABLET, DELAYED RELEASE ORAL ONCE A DAY
Qty: 90 TABLET | Refills: 2 | Status: ACTIVE | COMMUNITY

## 2022-11-10 RX ORDER — BUPRENORPHINE 7.5 UG/H
PATCH, EXTENDED RELEASE TRANSDERMAL
Qty: 4 PATCH | Status: ACTIVE | COMMUNITY

## 2022-11-10 RX ORDER — METOPROLOL SUCCINATE 25 MG/1
TABLET, EXTENDED RELEASE ORAL
Qty: 90 TABLET | Status: ACTIVE | COMMUNITY

## 2022-11-10 RX ORDER — LEVOTHYROXINE SODIUM 88 UG/1
TABLET ORAL
Qty: 90 TABLET | Status: ACTIVE | COMMUNITY

## 2022-11-10 RX ORDER — LEVOTHYROXINE SODIUM 75 UG/1
1 TABLET IN THE MORNING ON AN EMPTY STOMACH TABLET ORAL ONCE A DAY
Status: ACTIVE | COMMUNITY

## 2022-11-10 RX ORDER — TRETINOIN 1 MG/G
CREAM TOPICAL
Qty: 45 GRAM | Status: ACTIVE | COMMUNITY

## 2022-11-10 RX ORDER — CYANOCOBALAMIN (VITAMIN B-12) 500 MCG
1 TABLET TABLET ORAL ONCE A DAY
Status: ACTIVE | COMMUNITY

## 2022-11-10 RX ORDER — ISOSORBIDE MONONITRATE 30 MG/1
1 TABLET IN THE MORNING TABLET, EXTENDED RELEASE ORAL ONCE A DAY
Status: ACTIVE | COMMUNITY

## 2022-11-10 RX ORDER — OMEGA-3-ACID ETHYL ESTERS 1 G/1
CAPSULE, LIQUID FILLED ORAL
Qty: 360 CAPSULE | Status: ACTIVE | COMMUNITY

## 2022-11-10 RX ORDER — AMLODIPINE BESYLATE 10 MG/1
1 TABLET TABLET ORAL ONCE A DAY
Status: ACTIVE | COMMUNITY

## 2022-11-10 RX ORDER — ESTRADIOL 0.1 MG/G
AS DIRECTED CREAM VAGINAL
Status: ACTIVE | COMMUNITY

## 2022-11-10 RX ORDER — FLUOCINONIDE 0.5 MG/ML
SOLUTION TOPICAL
Qty: 60 UNSPECIFIED | Status: ACTIVE | COMMUNITY

## 2022-11-10 NOTE — HPI-TODAY'S VISIT:
The patient presents for a gastroenterology evaluation following a recent inpt hospital stay. A copy of this note will be sent to the referring physician.  She was recently hospitalized in Dec 2021 for intractable nausea for weeks, BRIGITTE, sepsis on Meropenem and acute respiratory failure with possible pneumonia. She was noted to have transfer dysphagia with evidence of silent aspiration with all consistencies, Dobhoff was placed to administer TFs. She also has chronic back pain/R hip pain radiating to RLQ, thrombocytopenia, GERD, NIDDM, CKD 3, Carotid artery disease, HTN and HLD  I started her on Scopolamine patch and provided time until all other acute issues imrpoved. She has in fact done significantly well from a GI standpoint.   She is not using the Scopolamine or Reglan anymore.   She has been having hot flashes; on Sertraline (recenlty increased from 25 to 50mg day) as prescribed by Dr Acosta. She is also on Isosorbide 60mg daily for HTN.    Sh has been doing quite well.  She might experience nausea very sporadically.   The RLQ pain has gotten a bit better. She is using the Miralax daily as well as Docusate QOD, which has been helping with her constipation.  She has a lot of pain in her legs related to her diabetic neuropathy. She is on a B complex cocktail.  She has been compliant with using the Protonix daily. Her appetite has been overall good.  She only has dysphagia wehn she eats [employer] hot dogs.   She does not need new prescritpions right now.   The patient underwent EGD with dilation by Dr. Lino Rod in 2011. UGI 12/15 for similar complains showed an irregular narrowing at GE junction (?stricture), a small sliding HH and diffuse tertiary esophageal contractions. She reports a remote history of PUD (DU).   She has been taking Florastor daily.  Her  passed away from colon cancer. Her son, who is in his 60's has refused to have a colonoscopy done.  She had 3 back surgeries by Dr. Amanda Pugh.   She is accompanied by her son today. Her daughter will be moving from [place] to also help take care of her.  Summary of prior workup:  - Labs on 1/18/22: sodium 138, potassium 4.5, glucose 114, BUN 36, creatinine 1.27, total protein 7.2, albumin 4.2, total bilirubin 0.6, ALT 8, AST 15, alkaline phosphatase 58. TSH 0.08. Free thyroxine 1.81. White blood cell count 9.2, hemoglobin 12.7, MCV 93, platelets 240. - Chest x-ray, portable view, done on admission showing small left pleural effusion with associated atelectasis with no dense focal airspace opacity. - KUB on 12/06/2021, showing nonspecific nonobstructive bowel gas pattern. - Bilateral renal ultrasound with the bladder done on 12/06 showingldly increased renal echogenicity with no hydronephrosis. - CT scan of the abdomen and pelvis without IV contrast done on 12/06 showing gallbladder distended with small amount of sludge with no stones or gross wall thickening.  New small bilateral pleural effusion with patchy infiltrate or atelectasis at the lung bases with no focal fluid collection in the abdomen and pelvis. - CXR on 12/06/2021: new bilateral interstitial opacities favoring interstitial mild pulmonary edema or atypical infection with stable trace pleural effusion bilaterally and atelectasis. - CT scan of abdomen without IV contrast  on 12/08: no  acute abnormality with stable asymmetric atrophy of the left kidney and no hydronephrosis. 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 - MRI of the right hip without contrast on 12/09 showing right gluteus  and minimus insertion tendinopathy with mild edema along the abductor brevis, abductor longus, abductor externus muscle that is nonspecific and probably reflecting mild muscle strain.  - CT A/P 10/17/19: Very subtle wall thickening at the level of the cecum with minimal pericolonic inflammatory change. Short segment colitis or uncomplicated diverticulitis given adjacent diverticulum. No surrounding fluid or air. No hydronephrosis, hydroureter, or renal calculi. No bowel obstruction or dilatation.  - CT abdomen and pelvis w/o contrast on 10/25/18 revealed fecal retention without intestinal obstruction. No - CT evidence of appendicitis. Cystic structure in the right kidney which likely represents a cyst but incompletely characterized. Shrunken left kidney. No urinary obstruction. Small hiatal hernia with evidence of gastroesophageal reflux. No pericardial effusion. Normal liver, spleen, pancreas, adrenal glands and intestines. No large colonic mass. Diverticulosis.  - Colonoscopy on 8/29/18 showed a 4 mm ascending colon TA and sigm diverticulosis; otherwise normal TI and colon mucosa. - E/C by me on 7/12/18 revealed a tortuous esoph with Grade B reflux esophagitis, mild gastritis and a normal duodenum. Her colonoscopy had to be aborted as there was solid stool in the rectum. - Pelvic US on 7/3/18: Status post total hysterectomy. No obvious pelvic or adnexal mass on limited transabdominalevaluation.  - Abd US on 7/3/18: Mildly heterogeneous hepatic echogenicity, consistent with nonspecific hepatic parenchymal changes, including hepatic steatosis. Increased renal echogenicity, consistent with diffuse renal parenchymal disease, with asymmetric left renal cortical atrophy. No hydronephrosis. No sonographic evidence for cholelithiasis or acute cholecystitis.  - Labs on 7/3/18 showed a normal CBC, LFT's, TSH. Vit D level.  - Pharmacologic nuclear stress test in Feb 2018: Perfusion: Small, mild, paradoxic apical defect. This likely represents artifact. Systolic Function: Normal left ventricular systolic function and wall motion with an EF of 79%. Appropriate Use Criteria for Revascularization: Low-risk

## 2023-05-11 ENCOUNTER — OFFICE VISIT (OUTPATIENT)
Dept: URBAN - METROPOLITAN AREA CLINIC 78 | Facility: CLINIC | Age: 88
End: 2023-05-11
Payer: MEDICARE

## 2023-05-11 VITALS
DIASTOLIC BLOOD PRESSURE: 61 MMHG | HEART RATE: 61 BPM | HEIGHT: 63 IN | TEMPERATURE: 98.3 F | BODY MASS INDEX: 20.38 KG/M2 | WEIGHT: 115 LBS | RESPIRATION RATE: 17 BRPM | SYSTOLIC BLOOD PRESSURE: 111 MMHG

## 2023-05-11 DIAGNOSIS — R12 HEARTBURN: ICD-10-CM

## 2023-05-11 DIAGNOSIS — R23.2 HOT FLASHES: ICD-10-CM

## 2023-05-11 DIAGNOSIS — R11.0 NAUSEA: ICD-10-CM

## 2023-05-11 DIAGNOSIS — K59.01 CONSTIPATION: ICD-10-CM

## 2023-05-11 DIAGNOSIS — R10.9 ABDOMINAL PAIN: ICD-10-CM

## 2023-05-11 DIAGNOSIS — K27.9 PUD (PEPTIC ULCER DISEASE): ICD-10-CM

## 2023-05-11 DIAGNOSIS — R43.2 DYSGEUSIA: ICD-10-CM

## 2023-05-11 DIAGNOSIS — R53.83 FATIGUE, UNSPECIFIED TYPE: ICD-10-CM

## 2023-05-11 DIAGNOSIS — R13.12 TRANSFER DYSPHAGIA: ICD-10-CM

## 2023-05-11 PROCEDURE — 99214 OFFICE O/P EST MOD 30 MIN: CPT | Performed by: INTERNAL MEDICINE

## 2023-05-11 RX ORDER — TELMISARTAN 20 MG/1
2 TABLETS TABLET ORAL ONCE A DAY
Status: ACTIVE | COMMUNITY

## 2023-05-11 RX ORDER — PANTOPRAZOLE SODIUM 40 MG/1
1 TABLET TABLET, DELAYED RELEASE ORAL ONCE A DAY
Qty: 90 TABLET | Refills: 2 | Status: ACTIVE | COMMUNITY

## 2023-05-11 RX ORDER — TRETINOIN 1 MG/G
CREAM TOPICAL
Qty: 45 GRAM | Status: ACTIVE | COMMUNITY

## 2023-05-11 RX ORDER — ISOSORBIDE MONONITRATE 30 MG/1
1 TABLET IN THE MORNING TABLET, EXTENDED RELEASE ORAL ONCE A DAY
Status: ACTIVE | COMMUNITY

## 2023-05-11 RX ORDER — ATORVASTATIN CALCIUM 20 MG/1
TABLET, FILM COATED ORAL
Qty: 90 TABLET | Status: ACTIVE | COMMUNITY

## 2023-05-11 RX ORDER — LEVOTHYROXINE SODIUM 100 UG/1
TABLET ORAL
Qty: 90 TABLET | Status: ACTIVE | COMMUNITY

## 2023-05-11 RX ORDER — ONDANSETRON 8 MG/1
1 TABLET ON THE TONGUE AND ALLOW TO DISSOLVE  AS NEEDED TABLET, ORALLY DISINTEGRATING ORAL
Qty: 60 | Refills: 2 | Status: ACTIVE | COMMUNITY

## 2023-05-11 RX ORDER — CYANOCOBALAMIN (VITAMIN B-12) 500 MCG
1 TABLET TABLET ORAL ONCE A DAY
Status: ON HOLD | COMMUNITY

## 2023-05-11 RX ORDER — AMLODIPINE BESYLATE 10 MG/1
1 TABLET TABLET ORAL ONCE A DAY
Status: ACTIVE | COMMUNITY

## 2023-05-11 RX ORDER — DULOXETINE 30 MG/1
1 CAPSULE CAPSULE, DELAYED RELEASE PELLETS ORAL ONCE A DAY
Status: ACTIVE | COMMUNITY

## 2023-05-11 RX ORDER — METOPROLOL SUCCINATE 25 MG/1
TABLET, EXTENDED RELEASE ORAL
Qty: 90 TABLET | Status: ACTIVE | COMMUNITY

## 2023-05-11 RX ORDER — BUPRENORPHINE 7.5 UG/H
PATCH, EXTENDED RELEASE TRANSDERMAL
Qty: 4 PATCH | Status: ACTIVE | COMMUNITY

## 2023-05-11 RX ORDER — DOXYCYCLINE HYCLATE 100 MG/1
CAPSULE ORAL
Qty: 60 CAPSULE | Status: ON HOLD | COMMUNITY

## 2023-05-11 RX ORDER — CALCIPOTRIENE 0.05 MG/ML
SOLUTION TOPICAL
Qty: 60 UNSPECIFIED | Status: ACTIVE | COMMUNITY

## 2023-05-11 RX ORDER — ONDANSETRON 4 MG/1
1 TABLET ON THE TONGUE AND ALLOW TO DISSOLVE AS NEEDED TABLET, ORALLY DISINTEGRATING ORAL
Qty: 60 | Refills: 1

## 2023-05-11 RX ORDER — MIRABEGRON 50 MG/1
TABLET, FILM COATED, EXTENDED RELEASE ORAL
Qty: 90 TABLET | Status: ON HOLD | COMMUNITY

## 2023-05-11 RX ORDER — SACCHAROMYCES BOULARDII 50 MG
1 CAPSULE CAPSULE ORAL TWICE A DAY
Status: ACTIVE | COMMUNITY

## 2023-05-11 RX ORDER — KETOCONAZOLE 20 MG/ML
SHAMPOO, SUSPENSION TOPICAL
Qty: 120 UNSPECIFIED | Status: ACTIVE | COMMUNITY

## 2023-05-11 RX ORDER — OMEGA-3-ACID ETHYL ESTERS 1 G/1
CAPSULE, LIQUID FILLED ORAL
Qty: 360 CAPSULE | Status: ACTIVE | COMMUNITY

## 2023-05-11 RX ORDER — OXYCODONE HYDROCHLORIDE 5 MG/1
1 TABLET AS NEEDED TABLET ORAL
Status: ON HOLD | COMMUNITY

## 2023-05-11 RX ORDER — ESTRADIOL 0.1 MG/G
AS DIRECTED CREAM VAGINAL
Status: ACTIVE | COMMUNITY

## 2023-05-11 RX ORDER — LEVOTHYROXINE SODIUM 75 UG/1
1 TABLET IN THE MORNING ON AN EMPTY STOMACH TABLET ORAL ONCE A DAY
Status: ACTIVE | COMMUNITY

## 2023-05-11 RX ORDER — PIOGLITAZONE HYDROCHLORIDE 15 MG/1
1 TABLET TABLET ORAL ONCE A DAY
Status: ACTIVE | COMMUNITY

## 2023-05-11 RX ORDER — DICLOFENAC SODIUM TOPICAL GEL, 1% 10 MG/G
AS DIRECTED GEL TOPICAL
Status: ACTIVE | COMMUNITY

## 2023-05-11 RX ORDER — LEVOTHYROXINE SODIUM 88 UG/1
TABLET ORAL
Qty: 90 TABLET | Status: ACTIVE | COMMUNITY

## 2023-05-11 RX ORDER — PANTOPRAZOLE SODIUM 40 MG/1
1 TABLET TABLET, DELAYED RELEASE ORAL EVERY OTHER DAY
Qty: 60 | Refills: 3 | OUTPATIENT
Start: 2023-05-11

## 2023-05-11 RX ORDER — FLUOCINONIDE 0.5 MG/ML
SOLUTION TOPICAL
Qty: 60 UNSPECIFIED | Status: ACTIVE | COMMUNITY

## 2023-05-11 NOTE — HPI-TODAY'S VISIT:
The patient presents for a 6 month follow up evaluation. A copy of this note will be sent to the referring physician.  She was recently hospitalized in Dec 2021 for intractable nausea for weeks, BRIGITTE, sepsis on Meropenem and acute respiratory failure with possible pneumonia. She was noted to have transfer dysphagia with evidence of silent aspiration with all consistencies. Dobhoff was placed to administer TFs. She also has chronic back pain/R hip pain radiating to RLQ, thrombocytopenia, GERD, NIDDM, CKD 3, Carotid artery disease, HTN and HLD  She has been having hot flashes; on Sertraline 50mg day as prescribed by Dr Acosta. She is also taking Synthroid 88 alternating with 100mcg QOD.  She is also on Isosorbide 60mg daily for HTN.    Overall, from a GI standpoint she has been doing quite well.  She might experience nausea very sporadically.  She is not using the Scopolamine or Reglan anymore.  The RLQ pain has gotten a bit better. She is using the Miralax daily as well as Docusate QOD, which has been helping with her constipation.  She has been compliant with using the Protonix daily. Her appetite has been overall good. She only has dysphagia wehn she eats [employer] hot dogs. Of note, the patient underwent EGD with dilation by Dr. Lino Rod in 2011. UGI 12/15 for similar complains showed an irregular narrowing at GE junction (?stricture), a small sliding HH and diffuse tertiary esophageal contractions. She reports a remote history of PUD (DU).   She has a lot of pain in her legs related to her diabetic neuropathy. She is on a B complex cocktail.  She continues to have issues with sacroilitis.   She has been taking Florastor daily.  Her  passed away from colon cancer. Her son, who is in his 60's has refused to have a colonoscopy done.  She had 3 back surgeries by Dr. Amanda Pugh.   She is accompanied by her daughter who moved from [place] to also help take care of her.  Summary of prior workup:  - Labs on 1/18/22: sodium 138, potassium 4.5, glucose 114, BUN 36, creatinine 1.27, total protein 7.2, albumin 4.2, total bilirubin 0.6, ALT 8, AST 15, alkaline phosphatase 58. TSH 0.08. Free thyroxine 1.81. White blood cell count 9.2, hemoglobin 12.7, MCV 93, platelets 240. - Chest x-ray, portable view, done on admission showing small left pleural effusion with associated atelectasis with no dense focal airspace opacity. - KUB on 12/06/2021, showing nonspecific nonobstructive bowel gas pattern. - Bilateral renal ultrasound with the bladder done on 12/06 showingldly increased renal echogenicity with no hydronephrosis. - CT scan of the abdomen and pelvis without IV contrast done on 12/06 showing gallbladder distended with small amount of sludge with no stones or gross wall thickening.  New small bilateral pleural effusion with patchy infiltrate or atelectasis at the lung bases with no focal fluid collection in the abdomen and pelvis. - CXR on 12/06/2021: new bilateral interstitial opacities favoring interstitial mild pulmonary edema or atypical infection with stable trace pleural effusion bilaterally and atelectasis. - CT scan of abdomen without IV contrast  on 12/08: no  acute abnormality with stable asymmetric atrophy of the left kidney and no hydronephrosis. 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 - MRI of the right hip without contrast on 12/09 showing right gluteus  and minimus insertion tendinopathy with mild edema along the abductor brevis, abductor longus, abductor externus muscle that is nonspecific and probably reflecting mild muscle strain.  - CT A/P 10/17/19: Very subtle wall thickening at the level of the cecum with minimal pericolonic inflammatory change. Short segment colitis or uncomplicated diverticulitis given adjacent diverticulum. No surrounding fluid or air. No hydronephrosis, hydroureter, or renal calculi. No bowel obstruction or dilatation.  - CT abdomen and pelvis w/o contrast on 10/25/18 revealed fecal retention without intestinal obstruction. No - CT evidence of appendicitis. Cystic structure in the right kidney which likely represents a cyst but incompletely characterized. Shrunken left kidney. No urinary obstruction. Small hiatal hernia with evidence of gastroesophageal reflux. No pericardial effusion. Normal liver, spleen, pancreas, adrenal glands and intestines. No large colonic mass. Diverticulosis.  - Colonoscopy on 8/29/18 showed a 4 mm ascending colon TA and sigm diverticulosis; otherwise normal TI and colon mucosa. - E/C by me on 7/12/18 revealed a tortuous esoph with Grade B reflux esophagitis, mild gastritis and a normal duodenum. Her colonoscopy had to be aborted as there was solid stool in the rectum. - Pelvic US on 7/3/18: Status post total hysterectomy. No obvious pelvic or adnexal mass on limited transabdominalevaluation.  - Abd US on 7/3/18: Mildly heterogeneous hepatic echogenicity, consistent with nonspecific hepatic parenchymal changes, including hepatic steatosis. Increased renal echogenicity, consistent with diffuse renal parenchymal disease, with asymmetric left renal cortical atrophy. No hydronephrosis. No sonographic evidence for cholelithiasis or acute cholecystitis.  - Labs on 7/3/18 showed a normal CBC, LFT's, TSH. Vit D level.  - Pharmacologic nuclear stress test in Feb 2018: Perfusion: Small, mild, paradoxic apical defect. This likely represents artifact. Systolic Function: Normal left ventricular systolic function and wall motion with an EF of 79%. Appropriate Use Criteria for Revascularization: Low-risk

## 2024-03-11 ENCOUNTER — LAB (OUTPATIENT)
Dept: URBAN - METROPOLITAN AREA MEDICAL CENTER 10 | Facility: MEDICAL CENTER | Age: 89
End: 2024-03-11
Payer: MEDICARE

## 2024-03-11 DIAGNOSIS — R11.10 REGURGITATION: ICD-10-CM

## 2024-03-11 DIAGNOSIS — R13.19 DYSPHAGIA: ICD-10-CM

## 2024-03-11 PROCEDURE — 43235 EGD DIAGNOSTIC BRUSH WASH: CPT | Performed by: INTERNAL MEDICINE

## 2024-03-11 PROCEDURE — 99223 1ST HOSP IP/OBS HIGH 75: CPT | Performed by: INTERNAL MEDICINE

## 2024-03-11 PROCEDURE — G8427 DOCREV CUR MEDS BY ELIG CLIN: HCPCS | Performed by: INTERNAL MEDICINE

## 2024-03-11 PROCEDURE — 99255 IP/OBS CONSLTJ NEW/EST HI 80: CPT | Performed by: INTERNAL MEDICINE

## 2024-03-12 ENCOUNTER — LAB (OUTPATIENT)
Dept: URBAN - METROPOLITAN AREA MEDICAL CENTER 10 | Facility: MEDICAL CENTER | Age: 89
End: 2024-03-12
Payer: MEDICARE

## 2024-03-12 DIAGNOSIS — R13.19 DYSPHAGIA: ICD-10-CM

## 2024-03-12 PROCEDURE — 99232 SBSQ HOSP IP/OBS MODERATE 35: CPT | Performed by: INTERNAL MEDICINE

## 2024-03-25 ENCOUNTER — OV HOSPF/U (OUTPATIENT)
Dept: URBAN - METROPOLITAN AREA CLINIC 78 | Facility: CLINIC | Age: 89
End: 2024-03-25

## 2024-03-25 VITALS
BODY MASS INDEX: 19.14 KG/M2 | HEART RATE: 77 BPM | WEIGHT: 108 LBS | DIASTOLIC BLOOD PRESSURE: 73 MMHG | HEIGHT: 63 IN | TEMPERATURE: 97.5 F | SYSTOLIC BLOOD PRESSURE: 122 MMHG

## 2024-03-25 RX ORDER — TRETINOIN 1 MG/G
CREAM TOPICAL
Qty: 45 GRAM | Status: ACTIVE | COMMUNITY

## 2024-03-25 RX ORDER — PANTOPRAZOLE SODIUM 40 MG/1
1 TABLET TABLET, DELAYED RELEASE ORAL ONCE A DAY
Qty: 90 TABLET | Refills: 2 | Status: ACTIVE | COMMUNITY

## 2024-03-25 RX ORDER — DOXYCYCLINE HYCLATE 100 MG/1
CAPSULE ORAL
Qty: 60 CAPSULE | Status: ON HOLD | COMMUNITY

## 2024-03-25 RX ORDER — ATORVASTATIN CALCIUM 20 MG/1
TABLET, FILM COATED ORAL
Qty: 90 TABLET | Status: ACTIVE | COMMUNITY

## 2024-03-25 RX ORDER — FAMOTIDINE 20 MG/1
1 TABLET AT BEDTIME TABLET, FILM COATED ORAL ONCE A DAY
Qty: 90 | Refills: 1 | OUTPATIENT
Start: 2024-03-25

## 2024-03-25 RX ORDER — OXYCODONE HYDROCHLORIDE 5 MG/1
1 TABLET AS NEEDED TABLET ORAL
Status: ON HOLD | COMMUNITY

## 2024-03-25 RX ORDER — OMEGA-3-ACID ETHYL ESTERS 1 G/1
CAPSULE, LIQUID FILLED ORAL
Qty: 360 CAPSULE | Status: ACTIVE | COMMUNITY

## 2024-03-25 RX ORDER — PIOGLITAZONE HYDROCHLORIDE 15 MG/1
1 TABLET TABLET ORAL ONCE A DAY
Status: ACTIVE | COMMUNITY

## 2024-03-25 RX ORDER — CYANOCOBALAMIN (VITAMIN B-12) 500 MCG
1 TABLET TABLET ORAL ONCE A DAY
Status: ON HOLD | COMMUNITY

## 2024-03-25 RX ORDER — AMLODIPINE BESYLATE 10 MG/1
1 TABLET TABLET ORAL ONCE A DAY
Status: ACTIVE | COMMUNITY

## 2024-03-25 RX ORDER — MIRABEGRON 50 MG/1
TABLET, FILM COATED, EXTENDED RELEASE ORAL
Qty: 90 TABLET | Status: ON HOLD | COMMUNITY

## 2024-03-25 RX ORDER — FLUOCINONIDE 0.5 MG/ML
SOLUTION TOPICAL
Qty: 60 UNSPECIFIED | Status: ACTIVE | COMMUNITY

## 2024-03-25 RX ORDER — METOPROLOL SUCCINATE 25 MG/1
TABLET, EXTENDED RELEASE ORAL
Qty: 90 TABLET | Status: ACTIVE | COMMUNITY

## 2024-03-25 RX ORDER — ONDANSETRON 4 MG/1
1 TABLET ON THE TONGUE AND ALLOW TO DISSOLVE AS NEEDED TABLET, ORALLY DISINTEGRATING ORAL
Qty: 60 | Refills: 1 | Status: ACTIVE | COMMUNITY

## 2024-03-25 RX ORDER — PANTOPRAZOLE SODIUM 40 MG/1
1 TABLET TABLET, DELAYED RELEASE ORAL EVERY OTHER DAY
Qty: 60 | Refills: 3 | Status: ACTIVE | COMMUNITY
Start: 2023-05-11

## 2024-03-25 RX ORDER — ESTRADIOL 0.1 MG/G
AS DIRECTED CREAM VAGINAL
Status: ACTIVE | COMMUNITY

## 2024-03-25 RX ORDER — DULOXETINE 30 MG/1
1 CAPSULE CAPSULE, DELAYED RELEASE PELLETS ORAL ONCE A DAY
Status: ACTIVE | COMMUNITY

## 2024-03-25 RX ORDER — SACCHAROMYCES BOULARDII 50 MG
1 CAPSULE CAPSULE ORAL TWICE A DAY
Status: ACTIVE | COMMUNITY

## 2024-03-25 RX ORDER — ISOSORBIDE MONONITRATE 30 MG/1
1 TABLET IN THE MORNING TABLET, EXTENDED RELEASE ORAL ONCE A DAY
Status: ACTIVE | COMMUNITY

## 2024-03-25 RX ORDER — TELMISARTAN 20 MG/1
2 TABLETS TABLET ORAL ONCE A DAY
Status: ACTIVE | COMMUNITY

## 2024-03-25 RX ORDER — LEVOTHYROXINE SODIUM 100 UG/1
TABLET ORAL
Qty: 90 TABLET | Status: ACTIVE | COMMUNITY

## 2024-03-25 RX ORDER — PROMETHAZINE HYDROCHLORIDE 25 MG/1
1 SUPPOSITORY AS NEEDED SUPPOSITORY RECTAL
Qty: 60 | Refills: 3 | Status: ACTIVE | COMMUNITY
Start: 2024-03-15 | End: 2024-07-13

## 2024-03-25 RX ORDER — CALCIPOTRIENE 0.05 MG/ML
SOLUTION TOPICAL
Qty: 60 UNSPECIFIED | Status: ACTIVE | COMMUNITY

## 2024-03-25 RX ORDER — DICLOFENAC SODIUM TOPICAL GEL, 1% 10 MG/G
AS DIRECTED GEL TOPICAL
Status: ACTIVE | COMMUNITY

## 2024-03-25 RX ORDER — LEVOTHYROXINE SODIUM 88 UG/1
TABLET ORAL
Qty: 90 TABLET | Status: ACTIVE | COMMUNITY

## 2024-03-25 RX ORDER — LEVOTHYROXINE SODIUM 75 UG/1
1 TABLET IN THE MORNING ON AN EMPTY STOMACH TABLET ORAL ONCE A DAY
Status: ACTIVE | COMMUNITY

## 2024-03-25 RX ORDER — BUPRENORPHINE 7.5 UG/H
PATCH, EXTENDED RELEASE TRANSDERMAL
Qty: 4 PATCH | Status: ACTIVE | COMMUNITY

## 2024-03-25 RX ORDER — KETOCONAZOLE 20 MG/ML
SHAMPOO, SUSPENSION TOPICAL
Qty: 120 UNSPECIFIED | Status: ACTIVE | COMMUNITY

## 2024-03-25 NOTE — HPI-TODAY'S VISIT:
The patient presents for a 6 month follow up evaluation. A copy of this note will be sent to the referring physician.  She was recently hospitalized in Dec 2021 for intractable nausea for weeks, BRIGITTE, sepsis on Meropenem and acute respiratory failure with possible pneumonia. She was noted to have transfer dysphagia with evidence of silent aspiration with all consistencies. Dobhoff was placed to administer TFs. She also has chronic back pain/R hip pain radiating to RLQ, thrombocytopenia, GERD, NIDDM, CKD 3, Carotid artery disease, HTN and HLD  She has been having hot flashes; on Sertraline 50mg day as prescribed by Dr Acosta. She is also taking Synthroid 88 alternating with 100mcg QOD.  She is also on Isosorbide 60mg daily for HTN.    Overall, from a GI standpoint she has been doing quite well.  She might experience nausea very sporadically.  She is not using the Scopolamine or Reglan anymore.  The RLQ pain has gotten a bit better. She is using the Miralax daily as well as Docusate QOD, which has been helping with her constipation.  *She started feeling better as soon as the Cefdinir was stopped. She had repeat labs and the leukocytosis had worsened hence it was recommended she go back to the ED. They pancultured her, which was negatie. She is still positive for COVID.  She is feeling extremely fatigued.  She is not complaining of abdominal pain. Appetite has been getting better.  She is using Famotidine 20mg QHS.  She has been compliant with using the Protonix daily. Her appetite has been overall good. She only has dysphagia wehn she eats [employer] hot dogs. Of note, the patient underwent EGD with dilation by Dr. Lino Rod in 2011. UGI 12/15 for similar complains showed an irregular narrowing at GE junction (?stricture), a small sliding HH and diffuse tertiary esophageal contractions. She reports a remote history of PUD (DU).   She has a lot of pain in her legs related to her diabetic neuropathy. She is on a B complex cocktail.  She continues to have issues with sacroilitis.   She has been taking Florastor daily.  Her  passed away from colon cancer. Her son, who is in his 60's has refused to have a colonoscopy done.  She had 3 back surgeries by Dr. Amanda Pugh.   She is accompanied by her daughter who moved from [place] to also help take care of her.  Summary of prior workup:  - Labs on 1/18/22: sodium 138, potassium 4.5, glucose 114, BUN 36, creatinine 1.27, total protein 7.2, albumin 4.2, total bilirubin 0.6, ALT 8, AST 15, alkaline phosphatase 58. TSH 0.08. Free thyroxine 1.81. White blood cell count 9.2, hemoglobin 12.7, MCV 93, platelets 240. - Chest x-ray, portable view, done on admission showing small left pleural effusion with associated atelectasis with no dense focal airspace opacity. - KUB on 12/06/2021, showing nonspecific nonobstructive bowel gas pattern. - Bilateral renal ultrasound with the bladder done on 12/06 showingldly increased renal echogenicity with no hydronephrosis. - CT scan of the abdomen and pelvis without IV contrast done on 12/06 showing gallbladder distended with small amount of sludge with no stones or gross wall thickening.  New small bilateral pleural effusion with patchy infiltrate or atelectasis at the lung bases with no focal fluid collection in the abdomen and pelvis. - CXR on 12/06/2021: new bilateral interstitial opacities favoring interstitial mild pulmonary edema or atypical infection with stable trace pleural effusion bilaterally and atelectasis. - CT scan of abdomen without IV contrast  on 12/08: no  acute abnormality with stable asymmetric atrophy of the left kidney and no hydronephrosis. 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 - MRI of the right hip without contrast on 12/09 showing right gluteus  and minimus insertion tendinopathy with mild edema along the abductor brevis, abductor longus, abductor externus muscle that is nonspecific and probably reflecting mild muscle strain.  - CT A/P 10/17/19: Very subtle wall thickening at the level of the cecum with minimal pericolonic inflammatory change. Short segment colitis or uncomplicated diverticulitis given adjacent diverticulum. No surrounding fluid or air. No hydronephrosis, hydroureter, or renal calculi. No bowel obstruction or dilatation.  - CT abdomen and pelvis w/o contrast on 10/25/18 revealed fecal retention without intestinal obstruction. No - CT evidence of appendicitis. Cystic structure in the right kidney which likely represents a cyst but incompletely characterized. Shrunken left kidney. No urinary obstruction. Small hiatal hernia with evidence of gastroesophageal reflux. No pericardial effusion. Normal liver, spleen, pancreas, adrenal glands and intestines. No large colonic mass. Diverticulosis.  - Colonoscopy on 8/29/18 showed a 4 mm ascending colon TA and sigm diverticulosis; otherwise normal TI and colon mucosa. - E/C by me on 7/12/18 revealed a tortuous esoph with Grade B reflux esophagitis, mild gastritis and a normal duodenum. Her colonoscopy had to be aborted as there was solid stool in the rectum. - Pelvic US on 7/3/18: Status post total hysterectomy. No obvious pelvic or adnexal mass on limited transabdominalevaluation.  - Abd US on 7/3/18: Mildly heterogeneous hepatic echogenicity, consistent with nonspecific hepatic parenchymal changes, including hepatic steatosis. Increased renal echogenicity, consistent with diffuse renal parenchymal disease, with asymmetric left renal cortical atrophy. No hydronephrosis. No sonographic evidence for cholelithiasis or acute cholecystitis.  - Labs on 7/3/18 showed a normal CBC, LFT's, TSH. Vit D level.  - Pharmacologic nuclear stress test in Feb 2018: Perfusion: Small, mild, paradoxic apical defect. This likely represents artifact. Systolic Function: Normal left ventricular systolic function and wall motion with an EF of 79%. Appropriate Use Criteria for Revascularization: Low-risk

## 2024-03-28 ENCOUNTER — OV HOSPF/U (OUTPATIENT)
Dept: URBAN - METROPOLITAN AREA CLINIC 78 | Facility: CLINIC | Age: 89
End: 2024-03-28

## 2024-05-06 ENCOUNTER — OFFICE VISIT (OUTPATIENT)
Dept: URBAN - METROPOLITAN AREA CLINIC 78 | Facility: CLINIC | Age: 89
End: 2024-05-06

## 2024-06-04 ENCOUNTER — DASHBOARD ENCOUNTERS (OUTPATIENT)
Age: 89
End: 2024-06-04

## 2024-06-04 ENCOUNTER — OFFICE VISIT (OUTPATIENT)
Dept: URBAN - METROPOLITAN AREA CLINIC 78 | Facility: CLINIC | Age: 89
End: 2024-06-04
Payer: MEDICARE

## 2024-06-04 VITALS
TEMPERATURE: 98 F | BODY MASS INDEX: 19.77 KG/M2 | HEART RATE: 63 BPM | WEIGHT: 111.6 LBS | DIASTOLIC BLOOD PRESSURE: 75 MMHG | HEIGHT: 63 IN | SYSTOLIC BLOOD PRESSURE: 131 MMHG

## 2024-06-04 DIAGNOSIS — R12 HEARTBURN: ICD-10-CM

## 2024-06-04 DIAGNOSIS — N39.0 CHRONIC UTI: ICD-10-CM

## 2024-06-04 DIAGNOSIS — R11.0 NAUSEA: ICD-10-CM

## 2024-06-04 DIAGNOSIS — R19.5 LOOSE STOOLS: ICD-10-CM

## 2024-06-04 DIAGNOSIS — K59.01 CONSTIPATION: ICD-10-CM

## 2024-06-04 DIAGNOSIS — R23.2 HOT FLASHES: ICD-10-CM

## 2024-06-04 DIAGNOSIS — R10.9 ABDOMINAL PAIN: ICD-10-CM

## 2024-06-04 DIAGNOSIS — R13.12 TRANSFER DYSPHAGIA: ICD-10-CM

## 2024-06-04 DIAGNOSIS — R53.83 FATIGUE, UNSPECIFIED TYPE: ICD-10-CM

## 2024-06-04 DIAGNOSIS — K27.9 PUD (PEPTIC ULCER DISEASE): ICD-10-CM

## 2024-06-04 PROCEDURE — 99214 OFFICE O/P EST MOD 30 MIN: CPT | Performed by: INTERNAL MEDICINE

## 2024-06-04 RX ORDER — METOPROLOL SUCCINATE 25 MG/1
TABLET, EXTENDED RELEASE ORAL
Qty: 90 TABLET | Status: ACTIVE | COMMUNITY

## 2024-06-04 RX ORDER — PANTOPRAZOLE SODIUM 40 MG/1
1 TABLET TABLET, DELAYED RELEASE ORAL ONCE A DAY
Qty: 90 TABLET | Refills: 2 | Status: ACTIVE | COMMUNITY

## 2024-06-04 RX ORDER — KETOCONAZOLE 20 MG/ML
SHAMPOO, SUSPENSION TOPICAL
Qty: 120 UNSPECIFIED | Status: ACTIVE | COMMUNITY

## 2024-06-04 RX ORDER — SACCHAROMYCES BOULARDII 50 MG
1 CAPSULE CAPSULE ORAL TWICE A DAY
Status: ACTIVE | COMMUNITY

## 2024-06-04 RX ORDER — DULOXETINE 30 MG/1
1 CAPSULE CAPSULE, DELAYED RELEASE PELLETS ORAL ONCE A DAY
Status: ACTIVE | COMMUNITY

## 2024-06-04 RX ORDER — OXYCODONE HYDROCHLORIDE 5 MG/1
1 TABLET AS NEEDED TABLET ORAL
Status: ON HOLD | COMMUNITY

## 2024-06-04 RX ORDER — TELMISARTAN 20 MG/1
2 TABLETS TABLET ORAL ONCE A DAY
Status: ACTIVE | COMMUNITY

## 2024-06-04 RX ORDER — FLUOCINONIDE 0.5 MG/ML
SOLUTION TOPICAL
Qty: 60 UNSPECIFIED | Status: ACTIVE | COMMUNITY

## 2024-06-04 RX ORDER — OMEGA-3-ACID ETHYL ESTERS 1 G/1
CAPSULE, LIQUID FILLED ORAL
Qty: 360 CAPSULE | Status: ACTIVE | COMMUNITY

## 2024-06-04 RX ORDER — LEVOTHYROXINE SODIUM 75 UG/1
1 TABLET IN THE MORNING ON AN EMPTY STOMACH TABLET ORAL ONCE A DAY
Status: ACTIVE | COMMUNITY

## 2024-06-04 RX ORDER — CALCIPOTRIENE 0.05 MG/ML
SOLUTION TOPICAL
Qty: 60 UNSPECIFIED | Status: ACTIVE | COMMUNITY

## 2024-06-04 RX ORDER — ONDANSETRON 4 MG/1
1 TABLET ON THE TONGUE AND ALLOW TO DISSOLVE AS NEEDED TABLET, ORALLY DISINTEGRATING ORAL
Qty: 60 | Refills: 3

## 2024-06-04 RX ORDER — AMLODIPINE BESYLATE 10 MG/1
1 TABLET TABLET ORAL ONCE A DAY
Status: ACTIVE | COMMUNITY

## 2024-06-04 RX ORDER — ESTRADIOL 0.1 MG/G
AS DIRECTED CREAM VAGINAL
Status: ACTIVE | COMMUNITY

## 2024-06-04 RX ORDER — ISOSORBIDE MONONITRATE 30 MG/1
1 TABLET IN THE MORNING TABLET, EXTENDED RELEASE ORAL ONCE A DAY
Status: ACTIVE | COMMUNITY

## 2024-06-04 RX ORDER — BUPRENORPHINE 7.5 UG/H
PATCH, EXTENDED RELEASE TRANSDERMAL
Qty: 4 PATCH | Status: ACTIVE | COMMUNITY

## 2024-06-04 RX ORDER — PROMETHAZINE HYDROCHLORIDE 25 MG/1
1 SUPPOSITORY AS NEEDED SUPPOSITORY RECTAL
Qty: 60 | Refills: 3 | Status: ACTIVE | COMMUNITY
Start: 2024-03-15 | End: 2024-07-13

## 2024-06-04 RX ORDER — LEVOTHYROXINE SODIUM 88 UG/1
TABLET ORAL
Qty: 90 TABLET | Status: ACTIVE | COMMUNITY

## 2024-06-04 RX ORDER — FAMOTIDINE 20 MG/1
1 TABLET AT BEDTIME TABLET, FILM COATED ORAL ONCE A DAY
Qty: 90 | Refills: 3 | OUTPATIENT

## 2024-06-04 RX ORDER — ATORVASTATIN CALCIUM 20 MG/1
TABLET, FILM COATED ORAL
Qty: 90 TABLET | Status: ACTIVE | COMMUNITY

## 2024-06-04 RX ORDER — PIOGLITAZONE HYDROCHLORIDE 15 MG/1
1 TABLET TABLET ORAL ONCE A DAY
Status: ACTIVE | COMMUNITY

## 2024-06-04 RX ORDER — TRETINOIN 1 MG/G
CREAM TOPICAL
Qty: 45 GRAM | Status: ACTIVE | COMMUNITY

## 2024-06-04 RX ORDER — DICLOFENAC SODIUM TOPICAL GEL, 1% 10 MG/G
AS DIRECTED GEL TOPICAL
Status: ACTIVE | COMMUNITY

## 2024-06-04 RX ORDER — MIRABEGRON 50 MG/1
TABLET, FILM COATED, EXTENDED RELEASE ORAL
Qty: 90 TABLET | Status: ON HOLD | COMMUNITY

## 2024-06-04 RX ORDER — PANTOPRAZOLE SODIUM 40 MG/1
1 TABLET TABLET, DELAYED RELEASE ORAL EVERY OTHER DAY
Qty: 60 | Refills: 3 | Status: ACTIVE | COMMUNITY
Start: 2023-05-11

## 2024-06-04 RX ORDER — ONDANSETRON 4 MG/1
1 TABLET ON THE TONGUE AND ALLOW TO DISSOLVE AS NEEDED TABLET, ORALLY DISINTEGRATING ORAL
Qty: 60 | Refills: 1 | Status: ACTIVE | COMMUNITY

## 2024-06-04 RX ORDER — DOXYCYCLINE HYCLATE 100 MG/1
CAPSULE ORAL
Qty: 60 CAPSULE | Status: ON HOLD | COMMUNITY

## 2024-06-04 RX ORDER — CYANOCOBALAMIN (VITAMIN B-12) 500 MCG
1 TABLET TABLET ORAL ONCE A DAY
Status: ON HOLD | COMMUNITY

## 2024-06-04 RX ORDER — LEVOTHYROXINE SODIUM 100 UG/1
TABLET ORAL
Qty: 90 TABLET | Status: ACTIVE | COMMUNITY

## 2024-06-04 RX ORDER — FAMOTIDINE 20 MG/1
1 TABLET AT BEDTIME TABLET, FILM COATED ORAL ONCE A DAY
Qty: 90 | Refills: 1 | Status: ACTIVE | COMMUNITY
Start: 2024-03-25

## 2024-06-04 RX ORDER — MIRABEGRON 50 MG/1
1 TABLET TABLET, EXTENDED RELEASE ORAL ONCE A DAY
Status: ACTIVE | COMMUNITY

## 2024-06-04 NOTE — HPI-TODAY'S VISIT:
The patient presents following a recent inpt hospitalization. A copy of this note will be sent to the referring physician.  She was hospitalized in Dec 2021 for intractable nausea for weeks, BRIGITTE, sepsis on Meropenem and acute respiratory failure with possible pneumonia. She was noted to have transfer dysphagia with evidence of silent aspiration with all consistencies. Dobhoff was placed to administer TFs. She also has chronic back pain/R hip pain radiating to RLQ, thrombocytopenia, GERD, NIDDM, CKD 3, Carotid artery disease, HTN and HLD.  More recently she was hospitalized again in March 2024 at [facility] with pneumonia, oral Candidiasis and COVID.  She started feeling better as soon as the Cefdinir was stopped. She had repeat labs and the leukocytosis had worsened hence it was recommended she go back to the ED. There they pancultured her, which was negative.   Overall she tells me today she is doing well from a GI standpoint. She has rare episodes of belching and esoph spasms. She has been eating well. Her appetite has omproved. Her nausea has significiantly decreased. She has gained a bit of weight.  She is not complaining of abdominal pain. She is using Famotidine 20mg QHS. No episodes of dysphagia.  She is using the Miralax daily as well as Docusate QOD, which has been helping with her constipation.  UGI 12/15 for similar complains showed an irregular narrowing at GE junction (?stricture), a small sliding HH and diffuse tertiary esophageal contractions. She reports a remote history of PUD (DU).   She has a lot of pain in her legs related to her diabetic neuropathy. She is on a B complex cocktail. She continues to have issues with sacroilitis.   She has been taking Florastor daily.  Her  passed away from colon cancer. Her son, who is in his 60's has refused to have a colonoscopy done.  She has been having hot flashes; on Sertraline 50mg day as prescribed by Dr Acosta. She is also taking Synthroid 88 alternating with 100mcg QOD. She is also on Isosorbide 60mg daily for HTN.  She is accompanied by her daughter who moved from [place] to also help take care of her.  Summary of prior workup:  - EGD by me in March 2024: Tortuous esophagus.No endoscopic abnormality to explain patient's dysphagia.  2 cm hiatal hernia.  No gross lesions in the cardia or fundus.  Normal mucosa throughout the body and antrum. Normal duodenum. . - Labs on 1/18/22: sodium 138, potassium 4.5, glucose 114, BUN 36, creatinine 1.27, total protein 7.2, albumin 4.2, total bilirubin 0.6, ALT 8, AST 15, alkaline phosphatase 58. TSH 0.08. Free thyroxine 1.81. White blood cell count 9.2, hemoglobin 12.7, MCV 93, platelets 240. - Chest x-ray, portable view, done on admission showing small left pleural effusion with associated atelectasis with no dense focal airspace opacity. - KUB on 12/06/2021, showing nonspecific nonobstructive bowel gas pattern. - Bilateral renal ultrasound with the bladder done on 12/06 showingldly increased renal echogenicity with no hydronephrosis. - CT scan of the abdomen and pelvis without IV contrast done on 12/06 showing gallbladder distended with small amount of sludge with no stones or gross wall thickening.  New small bilateral pleural effusion with patchy infiltrate or atelectasis at the lung bases with no focal fluid collection in the abdomen and pelvis. - CXR on 12/06/2021: new bilateral interstitial opacities favoring interstitial mild pulmonary edema or atypical infection with stable trace pleural effusion bilaterally and atelectasis. - CT scan of abdomen without IV contrast  on 12/08: no  acute abnormality with stable asymmetric atrophy of the left kidney and no hydronephrosis. 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 - MRI of the right hip without contrast on 12/09 showing right gluteus  and minimus insertion tendinopathy with mild edema along the abductor brevis, abductor longus, abductor externus muscle that is nonspecific and probably reflecting mild muscle strain.  - CT A/P 10/17/19: Very subtle wall thickening at the level of the cecum with minimal pericolonic inflammatory change. Short segment colitis or uncomplicated diverticulitis given adjacent diverticulum. No surrounding fluid or air. No hydronephrosis, hydroureter, or renal calculi. No bowel obstruction or dilatation.  - CT abdomen and pelvis w/o contrast on 10/25/18 revealed fecal retention without intestinal obstruction. No - CT evidence of appendicitis. Cystic structure in the right kidney which likely represents a cyst but incompletely characterized. Shrunken left kidney. No urinary obstruction. Small hiatal hernia with evidence of gastroesophageal reflux. No pericardial effusion. Normal liver, spleen, pancreas, adrenal glands and intestines. No large colonic mass. Diverticulosis.  - Colonoscopy on 8/29/18 showed a 4 mm ascending colon TA and sigm diverticulosis; otherwise normal TI and colon mucosa. - E/C by me on 7/12/18 revealed a tortuous esoph with Grade B reflux esophagitis, mild gastritis and a normal duodenum. Her colonoscopy had to be aborted as there was solid stool in the rectum. - Pelvic US on 7/3/18: Status post total hysterectomy. No obvious pelvic or adnexal mass on limited transabdominalevaluation.  - Abd US on 7/3/18: Mildly heterogeneous hepatic echogenicity, consistent with nonspecific hepatic parenchymal changes, including hepatic steatosis. Increased renal echogenicity, consistent with diffuse renal parenchymal disease, with asymmetric left renal cortical atrophy. No hydronephrosis. No sonographic evidence for cholelithiasis or acute cholecystitis.  - Labs on 7/3/18 showed a normal CBC, LFT's, TSH. Vit D level.  - Pharmacologic nuclear stress test in Feb 2018: Perfusion: Small, mild, paradoxic apical defect. This likely represents artifact. Systolic Function: Normal left ventricular systolic function and wall motion with an EF of 79%. Appropriate Use Criteria for Revascularization: Low-risk

## 2024-12-05 ENCOUNTER — TELEPHONE ENCOUNTER (OUTPATIENT)
Dept: URBAN - METROPOLITAN AREA CLINIC 78 | Facility: CLINIC | Age: 89
End: 2024-12-05

## 2024-12-05 ENCOUNTER — OFFICE VISIT (OUTPATIENT)
Dept: URBAN - METROPOLITAN AREA CLINIC 78 | Facility: CLINIC | Age: 89
End: 2024-12-05
Payer: MEDICARE

## 2024-12-05 VITALS
HEART RATE: 68 BPM | TEMPERATURE: 98 F | HEIGHT: 63 IN | WEIGHT: 108 LBS | SYSTOLIC BLOOD PRESSURE: 125 MMHG | BODY MASS INDEX: 19.14 KG/M2 | DIASTOLIC BLOOD PRESSURE: 67 MMHG

## 2024-12-05 DIAGNOSIS — R23.2 HOT FLASHES: ICD-10-CM

## 2024-12-05 DIAGNOSIS — N39.0 CHRONIC UTI: ICD-10-CM

## 2024-12-05 DIAGNOSIS — R10.9 ABDOMINAL PAIN: ICD-10-CM

## 2024-12-05 DIAGNOSIS — R11.0 NAUSEA: ICD-10-CM

## 2024-12-05 DIAGNOSIS — R19.5 LOOSE STOOLS: ICD-10-CM

## 2024-12-05 DIAGNOSIS — K27.9 PUD (PEPTIC ULCER DISEASE): ICD-10-CM

## 2024-12-05 DIAGNOSIS — K59.01 CONSTIPATION: ICD-10-CM

## 2024-12-05 DIAGNOSIS — R13.12 TRANSFER DYSPHAGIA: ICD-10-CM

## 2024-12-05 DIAGNOSIS — R53.83 FATIGUE, UNSPECIFIED TYPE: ICD-10-CM

## 2024-12-05 DIAGNOSIS — R12 HEARTBURN: ICD-10-CM

## 2024-12-05 DIAGNOSIS — R41.89 COGNITIVE DECLINE: ICD-10-CM

## 2024-12-05 PROCEDURE — 99214 OFFICE O/P EST MOD 30 MIN: CPT | Performed by: INTERNAL MEDICINE

## 2024-12-05 RX ORDER — DICLOFENAC SODIUM TOPICAL GEL, 1% 10 MG/G
AS DIRECTED GEL TOPICAL
Status: ACTIVE | COMMUNITY

## 2024-12-05 RX ORDER — FAMOTIDINE 20 MG/1
1 TABLET AT BEDTIME TABLET, FILM COATED ORAL ONCE A DAY
Qty: 90 | Refills: 3 | Status: ACTIVE | COMMUNITY

## 2024-12-05 RX ORDER — METOPROLOL SUCCINATE 25 MG/1
TABLET, EXTENDED RELEASE ORAL
Qty: 90 TABLET | Status: ACTIVE | COMMUNITY

## 2024-12-05 RX ORDER — MIRABEGRON 50 MG/1
1 TABLET TABLET, EXTENDED RELEASE ORAL ONCE A DAY
Status: ACTIVE | COMMUNITY

## 2024-12-05 RX ORDER — ESTRADIOL 0.1 MG/G
AS DIRECTED CREAM VAGINAL
Status: ACTIVE | COMMUNITY

## 2024-12-05 RX ORDER — LEVOTHYROXINE SODIUM 88 UG/1
TABLET ORAL
Qty: 90 TABLET | Status: ACTIVE | COMMUNITY

## 2024-12-05 RX ORDER — FLUOCINONIDE 0.5 MG/ML
SOLUTION TOPICAL
Qty: 60 UNSPECIFIED | Status: ACTIVE | COMMUNITY

## 2024-12-05 RX ORDER — CALCIPOTRIENE 0.05 MG/ML
SOLUTION TOPICAL
Qty: 60 UNSPECIFIED | Status: ACTIVE | COMMUNITY

## 2024-12-05 RX ORDER — MIRABEGRON 50 MG/1
TABLET, FILM COATED, EXTENDED RELEASE ORAL
Qty: 90 TABLET | Status: ON HOLD | COMMUNITY

## 2024-12-05 RX ORDER — AMLODIPINE BESYLATE 10 MG/1
1 TABLET TABLET ORAL ONCE A DAY
Status: ACTIVE | COMMUNITY

## 2024-12-05 RX ORDER — PANTOPRAZOLE SODIUM 40 MG/1
1 TABLET TABLET, DELAYED RELEASE ORAL EVERY OTHER DAY
Qty: 60 | Refills: 3 | Status: ACTIVE | COMMUNITY
Start: 2023-05-11

## 2024-12-05 RX ORDER — ATORVASTATIN CALCIUM 20 MG/1
TABLET, FILM COATED ORAL
Qty: 90 TABLET | Status: ACTIVE | COMMUNITY

## 2024-12-05 RX ORDER — LEVOTHYROXINE SODIUM 75 UG/1
1 TABLET IN THE MORNING ON AN EMPTY STOMACH TABLET ORAL ONCE A DAY
Status: ACTIVE | COMMUNITY

## 2024-12-05 RX ORDER — ONDANSETRON 4 MG/1
1 TABLET ON THE TONGUE AND ALLOW TO DISSOLVE AS NEEDED TABLET, ORALLY DISINTEGRATING ORAL
Qty: 60 | Refills: 3 | Status: ACTIVE | COMMUNITY

## 2024-12-05 RX ORDER — ONDANSETRON 4 MG/1
1 TABLET ON THE TONGUE AND ALLOW TO DISSOLVE AS NEEDED TABLET, ORALLY DISINTEGRATING ORAL
Qty: 60 | Refills: 3

## 2024-12-05 RX ORDER — LEVOTHYROXINE SODIUM 100 UG/1
TABLET ORAL
Qty: 90 TABLET | Status: ACTIVE | COMMUNITY

## 2024-12-05 RX ORDER — DOXYCYCLINE HYCLATE 100 MG/1
CAPSULE ORAL
Qty: 60 CAPSULE | Status: ON HOLD | COMMUNITY

## 2024-12-05 RX ORDER — ISOSORBIDE MONONITRATE 30 MG/1
1 TABLET IN THE MORNING TABLET, EXTENDED RELEASE ORAL ONCE A DAY
Status: ACTIVE | COMMUNITY

## 2024-12-05 RX ORDER — FAMOTIDINE 20 MG/1
1 TABLET AT BEDTIME TABLET, FILM COATED ORAL ONCE A DAY
Qty: 90 | Refills: 3 | OUTPATIENT

## 2024-12-05 RX ORDER — KETOCONAZOLE 20 MG/ML
SHAMPOO, SUSPENSION TOPICAL
Qty: 120 UNSPECIFIED | Status: ACTIVE | COMMUNITY

## 2024-12-05 RX ORDER — CYANOCOBALAMIN (VITAMIN B-12) 500 MCG
1 TABLET TABLET ORAL ONCE A DAY
Status: ON HOLD | COMMUNITY

## 2024-12-05 RX ORDER — TELMISARTAN 20 MG/1
2 TABLETS TABLET ORAL ONCE A DAY
Status: ACTIVE | COMMUNITY

## 2024-12-05 RX ORDER — OMEGA-3-ACID ETHYL ESTERS 1 G/1
CAPSULE, LIQUID FILLED ORAL
Qty: 360 CAPSULE | Status: ACTIVE | COMMUNITY

## 2024-12-05 RX ORDER — OXYCODONE HYDROCHLORIDE 5 MG/1
1 TABLET AS NEEDED TABLET ORAL
Status: ON HOLD | COMMUNITY

## 2024-12-05 RX ORDER — BUPRENORPHINE 7.5 UG/H
PATCH, EXTENDED RELEASE TRANSDERMAL
Qty: 4 PATCH | Status: ACTIVE | COMMUNITY

## 2024-12-05 RX ORDER — DULOXETINE 30 MG/1
1 CAPSULE CAPSULE, DELAYED RELEASE PELLETS ORAL ONCE A DAY
Status: ACTIVE | COMMUNITY

## 2024-12-05 RX ORDER — PANTOPRAZOLE SODIUM 40 MG/1
1 TABLET TABLET, DELAYED RELEASE ORAL ONCE A DAY
Qty: 90 TABLET | Refills: 2 | Status: ACTIVE | COMMUNITY

## 2024-12-05 RX ORDER — SACCHAROMYCES BOULARDII 50 MG
1 CAPSULE CAPSULE ORAL TWICE A DAY
Status: ACTIVE | COMMUNITY

## 2024-12-05 RX ORDER — PIOGLITAZONE HYDROCHLORIDE 15 MG/1
1 TABLET TABLET ORAL ONCE A DAY
Status: ACTIVE | COMMUNITY

## 2024-12-05 RX ORDER — TRETINOIN 1 MG/G
CREAM TOPICAL
Qty: 45 GRAM | Status: ACTIVE | COMMUNITY

## 2024-12-05 NOTE — HPI-TODAY'S VISIT:
The patient presents on referral form Sara Salazar for Multiple GI issues. A copy of this note will be sent to the referring physician.  She was hospitalized in Dec 2021 for intractable nausea for weeks, BRIGITTE, sepsis on Meropenem and acute respiratory failure with possible pneumonia. She was noted to have transfer dysphagia with evidence of silent aspiration with all consistencies. Dobhoff was placed to administer TFs. She also has chronic back pain/R hip pain radiating to RLQ, thrombocytopenia, GERD, NIDDM, CKD 3, Carotid artery disease, HTN and HLD.  More recently she was hospitalized again in March 2024 at [facility] with pneumonia, oral Candidiasis and COVID.  Overall she tells me today she is doing well from a GI standpoint. She is accompanied by her daughter. She has rare episodes of belching and esoph spasms. She has been eating well. Her appetite has improved. Her nausea has significantly decreased. She has gained a bit of weight.  She is not complaining of abdominal pain. She is using Famotidine 20mg QHS. No episodes of dysphagia.  She is now on Hydroxymethanamine She is also on B12 shots to see if this helps with her energy levels, Her cognitive levels have been slipping. She had an episode a few days ago where she was acting confused/AMS. Her daughter took her to the ED a few weeks ago were extensive evalaution was unremarkable.  She was not admitted.  She will be seeing the neurologist next week to determine if she has been having TIAs. She also recently had a Carotid US with Dr Torres.  She has not had any choking episodes. She stil has Famotidine at home.  From a GI standpoint she denies any particular complaints.  She suggested Dr. Salazar connet them with palliative care for support. They recommended hospice.   She is using the Miralax daily as well as Docusate QOD, which has been helping with her constipation.  She has a lot of pain in her legs related to her diabetic neuropathy. She is on a B complex cocktail. She continues to have issues with sacroilitis.   She has been taking Florastor daily.  Her  passed away from colon cancer. Her son, who is in his 60's has refused to have a colonoscopy done.  She has been having hot flashes; on Sertraline 50mg day as prescribed by Dr Acosta. She is also taking Synthroid 88 alternating with 100mcg QOD. She is also on Isosorbide 60mg daily for HTN.  She is accompanied by her daughter who moved from [place] to help take care of her.  Summary of prior workup:  - EGD by me in March 2024: Tortuous esophagus.No endoscopic abnormality to explain patient's dysphagia.  2 cm hiatal hernia.  No gross lesions in the cardia or fundus.  Normal mucosa throughout the body and antrum. Normal duodenum. .  - Labs on 1/18/22: sodium 138, potassium 4.5, glucose 114, BUN 36, creatinine 1.27, total protein 7.2, albumin 4.2, total bilirubin 0.6, ALT 8, AST 15, alkaline phosphatase 58. TSH 0.08. Free thyroxine 1.81. White blood cell count 9.2, hemoglobin 12.7, MCV 93, platelets 240. - UGI showed an irregular narrowing at GE junction (?stricture), a small sliding HH and diffuse tertiary esophageal contractions. She reports a remote history of PUD (DU). - Chest x-ray, portable view, done on admission showing small left pleural effusion with associated atelectasis with no dense focal airspace opacity. - KUB on 12/06/2021, showing nonspecific nonobstructive bowel gas pattern. - Bilateral renal ultrasound with the bladder done on 12/06 showingldly increased renal echogenicity with no hydronephrosis. - CT scan of the abdomen and pelvis without IV contrast done on 12/06 showing gallbladder distended with small amount of sludge with no stones or gross wall thickening.  New small bilateral pleural effusion with patchy infiltrate or atelectasis at the lung bases with no focal fluid collection in the abdomen and pelvis. - CXR on 12/06/2021: new bilateral interstitial opacities favoring interstitial mild pulmonary edema or atypical infection with stable trace pleural effusion bilaterally and atelectasis. - CT scan of abdomen without IV contrast  on 12/08: no  acute abnormality with stable asymmetric atrophy of the left kidney and no hydronephrosis. 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 - MRI of the right hip without contrast on 12/09 showing right gluteus  and minimus insertion tendinopathy with mild edema along the abductor brevis, abductor longus, abductor externus muscle that is nonspecific and probably reflecting mild muscle strain.  - CT A/P 10/17/19: Very subtle wall thickening at the level of the cecum with minimal pericolonic inflammatory change. Short segment colitis or uncomplicated diverticulitis given adjacent diverticulum. No surrounding fluid or air. No hydronephrosis, hydroureter, or renal calculi. No bowel obstruction or dilatation.  - CT abdomen and pelvis w/o contrast on 10/25/18 revealed fecal retention without intestinal obstruction. No - CT evidence of appendicitis. Cystic structure in the right kidney which likely represents a cyst but incompletely characterized. Shrunken left kidney. No urinary obstruction. Small hiatal hernia with evidence of gastroesophageal reflux. No pericardial effusion. Normal liver, spleen, pancreas, adrenal glands and intestines. No large colonic mass. Diverticulosis.  - Colonoscopy on 8/29/18 showed a 4 mm ascending colon TA and sigm diverticulosis; otherwise normal TI and colon mucosa. - E/C by me on 7/12/18 revealed a tortuous esoph with Grade B reflux esophagitis, mild gastritis and a normal duodenum. Her colonoscopy had to be aborted as there was solid stool in the rectum. - Pelvic US on 7/3/18: Status post total hysterectomy. No obvious pelvic or adnexal mass on limited transabdominalevaluation.  - Abd US on 7/3/18: Mildly heterogeneous hepatic echogenicity, consistent with nonspecific hepatic parenchymal changes, including hepatic steatosis. Increased renal echogenicity, consistent with diffuse renal parenchymal disease, with asymmetric left renal cortical atrophy. No hydronephrosis. No sonographic evidence for cholelithiasis or acute cholecystitis.  - Labs on 7/3/18 showed a normal CBC, LFT's, TSH. Vit D level.  - Pharmacologic nuclear stress test in Feb 2018: Perfusion: Small, mild, paradoxic apical defect. This likely represents artifact. Systolic Function: Normal left ventricular systolic function and wall motion with an EF of 79%. Appropriate Use Criteria for Revascularization: Low-risk

## 2024-12-05 NOTE — PHYSICAL EXAM CONSTITUTIONAL:
frail , in no acute distress , ambulating with some difficulty using a walker , normal communication ability

## 2025-07-09 ENCOUNTER — OFFICE VISIT (OUTPATIENT)
Dept: URBAN - METROPOLITAN AREA CLINIC 78 | Facility: CLINIC | Age: OVER 89
End: 2025-07-09
Payer: MEDICARE

## 2025-07-09 DIAGNOSIS — K64.8 INTERNAL HEMORRHOIDS: ICD-10-CM

## 2025-07-09 PROCEDURE — 99214 OFFICE O/P EST MOD 30 MIN: CPT | Performed by: INTERNAL MEDICINE

## 2025-07-09 PROCEDURE — 46600 DIAGNOSTIC ANOSCOPY SPX: CPT | Performed by: INTERNAL MEDICINE

## 2025-07-09 RX ORDER — DULOXETINE 30 MG/1
1 CAPSULE CAPSULE, DELAYED RELEASE PELLETS ORAL ONCE A DAY
Status: ACTIVE | COMMUNITY

## 2025-07-09 RX ORDER — PANTOPRAZOLE SODIUM 40 MG/1
1 TABLET TABLET, DELAYED RELEASE ORAL EVERY OTHER DAY
Qty: 60 | Refills: 3 | Status: ACTIVE | COMMUNITY
Start: 2023-05-11

## 2025-07-09 RX ORDER — FAMOTIDINE 20 MG/1
1 TABLET AT BEDTIME TABLET, FILM COATED ORAL ONCE A DAY
Qty: 90 | Refills: 3 | Status: ACTIVE | COMMUNITY

## 2025-07-09 RX ORDER — OXYCODONE HYDROCHLORIDE 5 MG/1
1 TABLET AS NEEDED TABLET ORAL
Status: ON HOLD | COMMUNITY

## 2025-07-09 RX ORDER — BUPRENORPHINE 7.5 UG/H
PATCH, EXTENDED RELEASE TRANSDERMAL
Qty: 4 PATCH | Status: ACTIVE | COMMUNITY

## 2025-07-09 RX ORDER — ONDANSETRON 4 MG/1
1 TABLET ON THE TONGUE AND ALLOW TO DISSOLVE AS NEEDED TABLET, ORALLY DISINTEGRATING ORAL
Qty: 60 | Refills: 3 | Status: ACTIVE | COMMUNITY

## 2025-07-09 RX ORDER — KETOCONAZOLE 20 MG/ML
SHAMPOO, SUSPENSION TOPICAL
Qty: 120 UNSPECIFIED | Status: ACTIVE | COMMUNITY

## 2025-07-09 RX ORDER — FLUOCINONIDE 0.5 MG/ML
SOLUTION TOPICAL
Qty: 60 UNSPECIFIED | Status: ACTIVE | COMMUNITY

## 2025-07-09 RX ORDER — FAMOTIDINE 20 MG/1
1 TABLET AT BEDTIME TABLET, FILM COATED ORAL ONCE A DAY
Qty: 90 | Refills: 3 | OUTPATIENT
Start: 2025-07-09

## 2025-07-09 RX ORDER — ISOSORBIDE MONONITRATE 30 MG/1
1 TABLET IN THE MORNING TABLET, EXTENDED RELEASE ORAL ONCE A DAY
Status: ACTIVE | COMMUNITY

## 2025-07-09 RX ORDER — PANTOPRAZOLE SODIUM 40 MG/1
1 TABLET TABLET, DELAYED RELEASE ORAL ONCE A DAY
Qty: 90 TABLET | Refills: 2 | Status: ACTIVE | COMMUNITY

## 2025-07-09 RX ORDER — PIOGLITAZONE 15 MG/1
1 TABLET TABLET ORAL ONCE A DAY
Status: ACTIVE | COMMUNITY

## 2025-07-09 RX ORDER — ESTRADIOL 0.1 MG/G
AS DIRECTED CREAM VAGINAL
Status: ACTIVE | COMMUNITY

## 2025-07-09 RX ORDER — LEVOTHYROXINE SODIUM 88 UG/1
TABLET ORAL
Qty: 90 TABLET | Status: ACTIVE | COMMUNITY

## 2025-07-09 RX ORDER — LEVOTHYROXINE SODIUM 75 UG/1
1 TABLET IN THE MORNING ON AN EMPTY STOMACH TABLET ORAL ONCE A DAY
Status: ACTIVE | COMMUNITY

## 2025-07-09 RX ORDER — FOSFOMYCIN TROMETHAMINE 3 G/1
AS DIRECTED GRANULE, FOR SOLUTION ORAL
Status: ACTIVE | COMMUNITY

## 2025-07-09 RX ORDER — TRETINOIN 1 MG/G
CREAM TOPICAL
Qty: 45 GRAM | Status: ACTIVE | COMMUNITY

## 2025-07-09 RX ORDER — DICLOFENAC SODIUM TOPICAL GEL, 1% 10 MG/G
AS DIRECTED GEL TOPICAL
Status: ACTIVE | COMMUNITY

## 2025-07-09 RX ORDER — CALCIPOTRIENE 0.05 MG/ML
SOLUTION TOPICAL
Qty: 60 UNSPECIFIED | Status: ACTIVE | COMMUNITY

## 2025-07-09 RX ORDER — OMEGA-3-ACID ETHYL ESTERS 1 G/1
CAPSULE, LIQUID FILLED ORAL
Qty: 360 CAPSULE | Status: ACTIVE | COMMUNITY

## 2025-07-09 RX ORDER — MIRABEGRON 50 MG/1
TABLET, FILM COATED, EXTENDED RELEASE ORAL
Qty: 90 TABLET | Status: ON HOLD | COMMUNITY

## 2025-07-09 RX ORDER — LEVOTHYROXINE SODIUM 100 UG/1
TABLET ORAL
Qty: 90 TABLET | Status: ACTIVE | COMMUNITY

## 2025-07-09 RX ORDER — DOXYCYCLINE HYCLATE 100 MG/1
CAPSULE ORAL
Qty: 60 CAPSULE | Status: ON HOLD | COMMUNITY

## 2025-07-09 RX ORDER — METOPROLOL SUCCINATE 25 MG/1
TABLET, EXTENDED RELEASE ORAL
Qty: 90 TABLET | Status: ACTIVE | COMMUNITY

## 2025-07-09 RX ORDER — MIRABEGRON 50 MG/1
1 TABLET TABLET, EXTENDED RELEASE ORAL ONCE A DAY
Status: ACTIVE | COMMUNITY

## 2025-07-09 RX ORDER — SACCHAROMYCES BOULARDII 50 MG
1 CAPSULE CAPSULE ORAL TWICE A DAY
Status: ACTIVE | COMMUNITY

## 2025-07-09 RX ORDER — ATORVASTATIN CALCIUM 20 MG/1
TABLET ORAL
Qty: 90 TABLET | Status: ACTIVE | COMMUNITY

## 2025-07-09 RX ORDER — ONDANSETRON 4 MG/1
1 TABLET ON THE TONGUE AND ALLOW TO DISSOLVE AS NEEDED TABLET, ORALLY DISINTEGRATING ORAL
Qty: 80 | Refills: 3
Start: 2025-07-09

## 2025-07-09 RX ORDER — CYANOCOBALAMIN (VITAMIN B-12) 500 MCG
1 TABLET TABLET ORAL ONCE A DAY
Status: ON HOLD | COMMUNITY

## 2025-07-09 RX ORDER — AMLODIPINE BESYLATE 10 MG/1
1 TABLET TABLET ORAL ONCE A DAY
Status: ACTIVE | COMMUNITY

## 2025-07-09 RX ORDER — TELMISARTAN 20 MG/1
2 TABLETS TABLET ORAL ONCE A DAY
Status: ACTIVE | COMMUNITY

## 2025-07-09 NOTE — HPI-TODAY'S VISIT:
The patient presents on referral form Sara Salazar for Multiple GI issues. A copy of this note will be sent to the referring physician.  She was hospitalized in Dec 2021 for intractable nausea for weeks, BRIGITTE, sepsis on Meropenem and acute respiratory failure with possible pneumonia. She was noted to have transfer dysphagia with evidence of silent aspiration with all consistencies. Dobhoff was placed to administer TFs. She also has chronic back pain/R hip pain radiating to RLQ, thrombocytopenia, GERD, NIDDM, CKD 3, Carotid artery disease, HTN and HLD.  She is now having a lot of issues with painful hemorrhoids. Her daughter has tried using HC suppositories and cream. They deny blood in the stools. She has not been constipated. In fact she has not even needed to use Miralax daily + Docusate QOD lately. She is on Florastor and Colace.   She admits to abdominal pain occurring sporadically. She is using Famotidine 20mg QHS. She has been eating well. Her appetite has improved. Her nausea has significantly decreased. She has gained a bit of weight.  No episodes of dysphagia. She has had choking at times. she even had an CXR a couple of weeks ago to assess for asp pneumonia.   She is now on Trazodone to see if this helps her sleep better.  She is now on Hydroxymethanamine as ordered by Dr. Barcenas to help prevent recurrent UTIs.  She will be finally seeing the Neurologist in Sept (there was a 9 monht wait for an appt). She had a Carotid US with Dr Torres in 2024.  She has a lot of pain in her legs related to her diabetic neuropathy. She is on a B complex cocktail. She continues to have issues with sacroilitis.   Her  passed away from colon cancer. Her son, who is in his 60's has refused to have a colonoscopy done.  She has been having hot flashes; on Sertraline 50mg day as prescribed by Dr Acosta. She is also taking Synthroid 88 alternating with 100mcg QOD. She is also on Isosorbide 60mg daily for HTN.  She is accompanied by her daughter who moved from [place] to help take care of her.  Summary of prior workup:  - EGD by me in March 2024: Tortuous esophagus.No endoscopic abnormality to explain patient's dysphagia.  2 cm hiatal hernia.  No gross lesions in the cardia or fundus.  Normal mucosa throughout the body and antrum. Normal duodenum. .  - Labs on 1/18/22: sodium 138, potassium 4.5, glucose 114, BUN 36, creatinine 1.27, total protein 7.2, albumin 4.2, total bilirubin 0.6, ALT 8, AST 15, alkaline phosphatase 58. TSH 0.08. Free thyroxine 1.81. White blood cell count 9.2, hemoglobin 12.7, MCV 93, platelets 240. - UGI showed an irregular narrowing at GE junction (?stricture), a small sliding HH and diffuse tertiary esophageal contractions. She reports a remote history of PUD (DU). - Chest x-ray, portable view, done on admission showing small left pleural effusion with associated atelectasis with no dense focal airspace opacity. - KUB on 12/06/2021, showing nonspecific nonobstructive bowel gas pattern. - Bilateral renal ultrasound with the bladder done on 12/06 showingldly increased renal echogenicity with no hydronephrosis. - CT scan of the abdomen and pelvis without IV contrast done on 12/06 showing gallbladder distended with small amount of sludge with no stones or gross wall thickening.  New small bilateral pleural effusion with patchy infiltrate or atelectasis at the lung bases with no focal fluid collection in the abdomen and pelvis. - CXR on 12/06/2021: new bilateral interstitial opacities favoring interstitial mild pulmonary edema or atypical infection with stable trace pleural effusion bilaterally and atelectasis. - CT scan of abdomen without IV contrast  on 12/08: no  acute abnormality with stable asymmetric atrophy of the left kidney and no hydronephrosis. 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 - MRI of the right hip without contrast on 12/09 showing right gluteus  and minimus insertion tendinopathy with mild edema along the abductor brevis, abductor longus, abductor externus muscle that is nonspecific and probably reflecting mild muscle strain.  - CT A/P 10/17/19: Very subtle wall thickening at the level of the cecum with minimal pericolonic inflammatory change. Short segment colitis or uncomplicated diverticulitis given adjacent diverticulum. No surrounding fluid or air. No hydronephrosis, hydroureter, or renal calculi. No bowel obstruction or dilatation.  - CT abdomen and pelvis w/o contrast on 10/25/18 revealed fecal retention without intestinal obstruction. No - CT evidence of appendicitis. Cystic structure in the right kidney which likely represents a cyst but incompletely characterized. Shrunken left kidney. No urinary obstruction. Small hiatal hernia with evidence of gastroesophageal reflux. No pericardial effusion. Normal liver, spleen, pancreas, adrenal glands and intestines. No large colonic mass. Diverticulosis.  - Colonoscopy on 8/29/18 showed a 4 mm ascending colon TA and sigm diverticulosis; otherwise normal TI and colon mucosa. - E/C by me on 7/12/18 revealed a tortuous esoph with Grade B reflux esophagitis, mild gastritis and a normal duodenum. Her colonoscopy had to be aborted as there was solid stool in the rectum. - Pelvic US on 7/3/18: Status post total hysterectomy. No obvious pelvic or adnexal mass on limited transabdominalevaluation.  - Abd US on 7/3/18: Mildly heterogeneous hepatic echogenicity, consistent with nonspecific hepatic parenchymal changes, including hepatic steatosis. Increased renal echogenicity, consistent with diffuse renal parenchymal disease, with asymmetric left renal cortical atrophy. No hydronephrosis. No sonographic evidence for cholelithiasis or acute cholecystitis.  - Labs on 7/3/18 showed a normal CBC, LFT's, TSH. Vit D level.  - Pharmacologic nuclear stress test in Feb 2018: Perfusion: Small, mild, paradoxic apical defect. This likely represents artifact. Systolic Function: Normal left ventricular systolic function and wall motion with an EF of 79%. Appropriate Use Criteria for Revascularization: Low-risk

## 2025-07-09 NOTE — PHYSICAL EXAM GASTROINTESTINAL
Abdomen , soft, nontender, nondistended , no guarding or rigidity , no masses palpable , normal bowel sounds , Liver and Spleen , no hepatomegaly present , no hepatosplenomegaly , liver nontender , spleen not palpable IHs- large, bulging noted on anoscopy. Mucus. Skin excoriations noted in the perianal region. No EHs. No abscess or perianal fluctuance. Decreased anal sphincter tone

## 2025-07-31 ENCOUNTER — OFFICE VISIT (OUTPATIENT)
Dept: URBAN - METROPOLITAN AREA CLINIC 78 | Facility: CLINIC | Age: OVER 89
End: 2025-07-31

## 2025-07-31 RX ORDER — DOXYCYCLINE HYCLATE 100 MG/1
CAPSULE ORAL
Qty: 60 CAPSULE | Status: ON HOLD | COMMUNITY

## 2025-07-31 RX ORDER — LEVOTHYROXINE SODIUM 88 UG/1
TABLET ORAL
Qty: 90 TABLET | Status: ACTIVE | COMMUNITY

## 2025-07-31 RX ORDER — TRETINOIN 1 MG/G
CREAM TOPICAL
Qty: 45 GRAM | Status: ACTIVE | COMMUNITY

## 2025-07-31 RX ORDER — DICLOFENAC SODIUM TOPICAL GEL, 1% 10 MG/G
AS DIRECTED GEL TOPICAL
Status: ACTIVE | COMMUNITY

## 2025-07-31 RX ORDER — ATORVASTATIN CALCIUM 20 MG/1
TABLET ORAL
Qty: 90 TABLET | Status: ACTIVE | COMMUNITY

## 2025-07-31 RX ORDER — SACCHAROMYCES BOULARDII 50 MG
1 CAPSULE CAPSULE ORAL TWICE A DAY
Status: ACTIVE | COMMUNITY

## 2025-07-31 RX ORDER — FLUOCINONIDE 0.5 MG/ML
SOLUTION TOPICAL
Qty: 60 UNSPECIFIED | Status: ACTIVE | COMMUNITY

## 2025-07-31 RX ORDER — OXYCODONE HYDROCHLORIDE 5 MG/1
1 TABLET AS NEEDED TABLET ORAL
Status: ON HOLD | COMMUNITY

## 2025-07-31 RX ORDER — FOSFOMYCIN TROMETHAMINE 3 G/1
AS DIRECTED GRANULE, FOR SOLUTION ORAL
Status: ACTIVE | COMMUNITY

## 2025-07-31 RX ORDER — ISOSORBIDE MONONITRATE 30 MG/1
1 TABLET IN THE MORNING TABLET, EXTENDED RELEASE ORAL ONCE A DAY
Status: ACTIVE | COMMUNITY

## 2025-07-31 RX ORDER — DULOXETINE 30 MG/1
1 CAPSULE CAPSULE, DELAYED RELEASE PELLETS ORAL ONCE A DAY
Status: ACTIVE | COMMUNITY

## 2025-07-31 RX ORDER — TELMISARTAN 20 MG/1
2 TABLETS TABLET ORAL ONCE A DAY
Status: ACTIVE | COMMUNITY

## 2025-07-31 RX ORDER — CALCIPOTRIENE 0.05 MG/ML
SOLUTION TOPICAL
Qty: 60 UNSPECIFIED | Status: ACTIVE | COMMUNITY

## 2025-07-31 RX ORDER — OMEGA-3-ACID ETHYL ESTERS 1 G/1
CAPSULE, LIQUID FILLED ORAL
Qty: 360 CAPSULE | Status: ACTIVE | COMMUNITY

## 2025-07-31 RX ORDER — HYOSCYAMINE SULFATE 0.12 MG/1
1 TABLET UNDER THE TONGUE AND ALLOW TO DISSOLVE TABLET, ORALLY DISINTEGRATING ORAL
Qty: 40 | Refills: 2 | OUTPATIENT
Start: 2025-07-31

## 2025-07-31 RX ORDER — ESTRADIOL 0.1 MG/G
AS DIRECTED CREAM VAGINAL
Status: ACTIVE | COMMUNITY

## 2025-07-31 RX ORDER — KETOCONAZOLE 20 MG/ML
SHAMPOO, SUSPENSION TOPICAL
Qty: 120 UNSPECIFIED | Status: ACTIVE | COMMUNITY

## 2025-07-31 RX ORDER — MIRABEGRON 50 MG/1
TABLET, FILM COATED, EXTENDED RELEASE ORAL
Qty: 90 TABLET | Status: ON HOLD | COMMUNITY

## 2025-07-31 RX ORDER — METOPROLOL SUCCINATE 25 MG/1
TABLET, EXTENDED RELEASE ORAL
Qty: 90 TABLET | Status: ACTIVE | COMMUNITY

## 2025-07-31 RX ORDER — CYANOCOBALAMIN (VITAMIN B-12) 500 MCG
1 TABLET TABLET ORAL ONCE A DAY
Status: ON HOLD | COMMUNITY

## 2025-07-31 RX ORDER — ONDANSETRON 4 MG/1
1 TABLET ON THE TONGUE AND ALLOW TO DISSOLVE AS NEEDED TABLET, ORALLY DISINTEGRATING ORAL
Qty: 80 | Refills: 3 | Status: ACTIVE | COMMUNITY
Start: 2025-07-09

## 2025-07-31 RX ORDER — FAMOTIDINE 20 MG/1
1 TABLET AT BEDTIME TABLET, FILM COATED ORAL ONCE A DAY
Qty: 90 | Refills: 3 | Status: ACTIVE | COMMUNITY
Start: 2025-07-09

## 2025-07-31 RX ORDER — BUPRENORPHINE 7.5 UG/H
PATCH, EXTENDED RELEASE TRANSDERMAL
Qty: 4 PATCH | Status: ACTIVE | COMMUNITY

## 2025-07-31 RX ORDER — AMLODIPINE BESYLATE 10 MG/1
1 TABLET TABLET ORAL ONCE A DAY
Status: ACTIVE | COMMUNITY

## 2025-07-31 RX ORDER — LEVOTHYROXINE SODIUM 100 UG/1
TABLET ORAL
Qty: 90 TABLET | Status: ACTIVE | COMMUNITY

## 2025-07-31 RX ORDER — PANTOPRAZOLE SODIUM 40 MG/1
1 TABLET TABLET, DELAYED RELEASE ORAL ONCE A DAY
Qty: 90 TABLET | Refills: 2 | Status: ACTIVE | COMMUNITY

## 2025-07-31 RX ORDER — MIRABEGRON 50 MG/1
1 TABLET TABLET, EXTENDED RELEASE ORAL ONCE A DAY
Status: ACTIVE | COMMUNITY

## 2025-07-31 RX ORDER — PANTOPRAZOLE SODIUM 40 MG/1
1 TABLET TABLET, DELAYED RELEASE ORAL EVERY OTHER DAY
Qty: 60 | Refills: 3 | Status: ACTIVE | COMMUNITY
Start: 2023-05-11

## 2025-07-31 RX ORDER — PIOGLITAZONE 15 MG/1
1 TABLET TABLET ORAL ONCE A DAY
Status: ACTIVE | COMMUNITY

## 2025-07-31 RX ORDER — LEVOTHYROXINE SODIUM 75 UG/1
1 TABLET IN THE MORNING ON AN EMPTY STOMACH TABLET ORAL ONCE A DAY
Status: ACTIVE | COMMUNITY

## 2025-07-31 NOTE — HPI-TODAY'S VISIT:
The patient presents on referral form Sara Salazar for severe anal . A copy of this note will be sent to the referring physician.  Patient has been compliant with using hydrocortisone suppositories for the past 2 weeks however she continues to be in excruciating pain at the level of the anus.  She still having some rectal bleeding.  She complains of abdominal cramping which is occurring intermittently but at times waking her up from sleep.   She is not having any issues related to constipation or diarrhea.  Her stools are soft but formed. She has not even needed to use Miralax daily + Docusate QOD lately. She is on Florastor and Colace.   She admits to abdominal pain occurring sporadically. She is using Famotidine 20mg QHS. She has been eating well. Her appetite has improved. Her nausea has significantly decreased. She has gained a bit of weight.  No episodes of dysphagia. She has had choking at times. she even had an CXR a couple of weeks ago to assess for asp pneumonia.   She is not on blood thinners.  She is now on Trazodone to see if this helps her sleep better.  She is now on Hydroxymethanamine as ordered by Dr. Barcenas to help prevent recurrent UTIs.  She will be finally seeing the Neurologist in Sept (there was a 9 monht wait for an appt). She had a Carotid US with Dr Torres in 2024.  She has a lot of pain in her legs related to her diabetic neuropathy. She is on a B complex cocktail. She continues to have issues with sacroilitis.   Her  passed away from colon cancer. Her son, who is in his 60's has refused to have a colonoscopy done.  She has been having hot flashes; on Sertraline 50mg day as prescribed by Dr Acosta. She is also taking Synthroid 88 alternating with 100mcg QOD. She is also on Isosorbide 60mg daily for HTN.  She was hospitalized in Dec 2021 for intractable nausea for weeks, BRIGITTE, sepsis on Meropenem and acute respiratory failure with possible pneumonia. She was noted to have transfer dysphagia with evidence of silent aspiration with all consistencies. Dobhoff was placed to administer TFs. She also has chronic back pain/R hip pain radiating to RLQ, GERD, NIDDM, CKD 3, Carotid artery disease, HTN and HLD.  She is accompanied by her daughter.  Summary of prior workup:  - CT A/P in March 2024: 1. Limited images through the lung bases demonstrate partially imaged consolidation in the lingula, full extent not included in the field-of-view. Cannot exclude a component of infection. Please correlate clinically. 2. Asymmetric atrophy left kidney. No hydronephrosis. No perinephric fluid collection identified as queried.  3. Otherwise limited noncontrast CT of the abdomen and pelvis. - EGD by me in March 2024: Tortuous esophagus.No endoscopic abnormality to explain patient's dysphagia.  2 cm hiatal hernia.  No gross lesions in the cardia or fundus.  Normal mucosa throughout the body and antrum. Normal duodenum. .  - Labs on 1/18/22: sodium 138, potassium 4.5, glucose 114, BUN 36, creatinine 1.27, total protein 7.2, albumin 4.2, total bilirubin 0.6, ALT 8, AST 15, alkaline phosphatase 58. TSH 0.08. Free thyroxine 1.81. White blood cell count 9.2, hemoglobin 12.7, MCV 93, platelets 240. - UGI showed an irregular narrowing at GE junction (?stricture), a small sliding HH and diffuse tertiary esophageal contractions. She reports a remote history of PUD (DU). - Chest x-ray, portable view, done on admission showing small left pleural effusion with associated atelectasis with no dense focal airspace opacity. - KUB on 12/06/2021, showing nonspecific nonobstructive bowel gas pattern. - Bilateral renal ultrasound with the bladder done on 12/06 showingldly increased renal echogenicity with no hydronephrosis. - CT scan of the abdomen and pelvis without IV contrast done on 12/06 showing gallbladder distended with small amount of sludge with no stones or gross wall thickening.  New small bilateral pleural effusion with patchy infiltrate or atelectasis at the lung bases with no focal fluid collection in the abdomen and pelvis. - CXR on 12/06/2021: new bilateral interstitial opacities favoring interstitial mild pulmonary edema or atypical infection with stable trace pleural effusion bilaterally and atelectasis. - CT scan of abdomen without IV contrast  on 12/08: no  acute abnormality with stable asymmetric atrophy of the left kidney and no hydronephrosis. 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 - MRI of the right hip without contrast on 12/09 showing right gluteus  and minimus insertion tendinopathy with mild edema along the abductor brevis, abductor longus, abductor externus muscle that is nonspecific and probably reflecting mild muscle strain.  - CT A/P 10/17/19: Very subtle wall thickening at the level of the cecum with minimal pericolonic inflammatory change. Short segment colitis or uncomplicated diverticulitis given adjacent diverticulum. No surrounding fluid or air. No hydronephrosis, hydroureter, or renal calculi. No bowel obstruction or dilatation.  - CT abdomen and pelvis w/o contrast on 10/25/18 revealed fecal retention without intestinal obstruction. No - CT evidence of appendicitis. Cystic structure in the right kidney which likely represents a cyst but incompletely characterized. Shrunken left kidney. No urinary obstruction. Small hiatal hernia with evidence of gastroesophageal reflux. No pericardial effusion. Normal liver, spleen, pancreas, adrenal glands and intestines. No large colonic mass. Diverticulosis.  - Colonoscopy on 8/29/18 showed a 4 mm ascending colon TA and sigm diverticulosis; otherwise normal TI and colon mucosa. - E/C by me on 7/12/18 revealed a tortuous esoph with Grade B reflux esophagitis, mild gastritis and a normal duodenum. Her colonoscopy had to be aborted as there was solid stool in the rectum. - Pelvic US on 7/3/18: Status post total hysterectomy. No obvious pelvic or adnexal mass on limited transabdominalevaluation.  - Abd US on 7/3/18: Mildly heterogeneous hepatic echogenicity, consistent with nonspecific hepatic parenchymal changes, including hepatic steatosis. Increased renal echogenicity, consistent with diffuse renal parenchymal disease, with asymmetric left renal cortical atrophy. No hydronephrosis. No sonographic evidence for cholelithiasis or acute cholecystitis.  - Labs on 7/3/18 showed a normal CBC, LFT's, TSH. Vit D level.  - Pharmacologic nuclear stress test in Feb 2018: Perfusion: Small, mild, paradoxic apical defect. This likely represents artifact. Systolic Function: Normal left ventricular systolic function and wall motion with an EF of 79%. Appropriate Use Criteria for Revascularization: Low-risk

## 2025-08-04 ENCOUNTER — CLAIMS CREATED FROM THE CLAIM WINDOW (OUTPATIENT)
Dept: URBAN - METROPOLITAN AREA MEDICAL CENTER 10 | Facility: MEDICAL CENTER | Age: OVER 89
End: 2025-08-04
Payer: MEDICARE

## 2025-08-04 DIAGNOSIS — R93.3 ABN FINDINGS-GI TRACT: ICD-10-CM

## 2025-08-04 PROCEDURE — 99222 1ST HOSP IP/OBS MODERATE 55: CPT | Performed by: INTERNAL MEDICINE

## 2025-08-04 PROCEDURE — 99254 IP/OBS CNSLTJ NEW/EST MOD 60: CPT | Performed by: INTERNAL MEDICINE

## 2025-08-04 PROCEDURE — G8427 DOCREV CUR MEDS BY ELIG CLIN: HCPCS | Performed by: INTERNAL MEDICINE

## 2025-08-05 ENCOUNTER — CLAIMS CREATED FROM THE CLAIM WINDOW (OUTPATIENT)
Dept: URBAN - METROPOLITAN AREA MEDICAL CENTER 10 | Facility: MEDICAL CENTER | Age: OVER 89
End: 2025-08-05
Payer: MEDICARE

## 2025-08-05 DIAGNOSIS — R93.3 ABN FINDINGS-GI TRACT: ICD-10-CM

## 2025-08-05 PROCEDURE — 99232 SBSQ HOSP IP/OBS MODERATE 35: CPT | Performed by: INTERNAL MEDICINE

## 2025-08-06 ENCOUNTER — CLAIMS CREATED FROM THE CLAIM WINDOW (OUTPATIENT)
Dept: URBAN - METROPOLITAN AREA MEDICAL CENTER 10 | Facility: MEDICAL CENTER | Age: OVER 89
End: 2025-08-06
Payer: MEDICARE

## 2025-08-06 DIAGNOSIS — R93.3 ABN FINDINGS-GI TRACT: ICD-10-CM

## 2025-08-06 PROCEDURE — 99232 SBSQ HOSP IP/OBS MODERATE 35: CPT | Performed by: INTERNAL MEDICINE

## 2025-08-07 ENCOUNTER — CLAIMS CREATED FROM THE CLAIM WINDOW (OUTPATIENT)
Dept: URBAN - METROPOLITAN AREA MEDICAL CENTER 10 | Facility: MEDICAL CENTER | Age: OVER 89
End: 2025-08-07
Payer: MEDICARE

## 2025-08-07 DIAGNOSIS — R10.84 ABDOMINAL PAIN, GENERALIZED: ICD-10-CM

## 2025-08-07 DIAGNOSIS — R93.3 ABN FINDINGS-GI TRACT: ICD-10-CM

## 2025-08-07 PROCEDURE — 99232 SBSQ HOSP IP/OBS MODERATE 35: CPT | Performed by: INTERNAL MEDICINE

## 2025-08-09 ENCOUNTER — CLAIMS CREATED FROM THE CLAIM WINDOW (OUTPATIENT)
Dept: URBAN - METROPOLITAN AREA MEDICAL CENTER 10 | Facility: MEDICAL CENTER | Age: OVER 89
End: 2025-08-09

## 2025-08-09 ENCOUNTER — CLAIMS CREATED FROM THE CLAIM WINDOW (OUTPATIENT)
Dept: URBAN - METROPOLITAN AREA MEDICAL CENTER 10 | Facility: MEDICAL CENTER | Age: OVER 89
End: 2025-08-09
Payer: MEDICARE

## 2025-08-09 DIAGNOSIS — K62.89 RECTAL PAIN: ICD-10-CM

## 2025-08-09 DIAGNOSIS — R93.3 ABN FINDINGS-GI TRACT: ICD-10-CM

## 2025-08-09 PROCEDURE — 99232 SBSQ HOSP IP/OBS MODERATE 35: CPT | Performed by: PHYSICIAN ASSISTANT

## 2025-08-09 PROCEDURE — 99232 SBSQ HOSP IP/OBS MODERATE 35: CPT | Performed by: STUDENT IN AN ORGANIZED HEALTH CARE EDUCATION/TRAINING PROGRAM

## 2025-08-10 ENCOUNTER — CLAIMS CREATED FROM THE CLAIM WINDOW (OUTPATIENT)
Dept: URBAN - METROPOLITAN AREA MEDICAL CENTER 10 | Facility: MEDICAL CENTER | Age: OVER 89
End: 2025-08-10
Payer: MEDICARE

## 2025-08-10 ENCOUNTER — CLAIMS CREATED FROM THE CLAIM WINDOW (OUTPATIENT)
Dept: URBAN - METROPOLITAN AREA MEDICAL CENTER 10 | Facility: MEDICAL CENTER | Age: OVER 89
End: 2025-08-10

## 2025-08-10 DIAGNOSIS — R93.3 ABN FINDINGS-GI TRACT: ICD-10-CM

## 2025-08-10 DIAGNOSIS — K62.89 RECTAL PAIN: ICD-10-CM

## 2025-08-10 PROCEDURE — 99232 SBSQ HOSP IP/OBS MODERATE 35: CPT | Performed by: INTERNAL MEDICINE

## 2025-08-10 PROCEDURE — 99232 SBSQ HOSP IP/OBS MODERATE 35: CPT | Performed by: PHYSICIAN ASSISTANT

## 2025-08-11 ENCOUNTER — CLAIMS CREATED FROM THE CLAIM WINDOW (OUTPATIENT)
Dept: URBAN - METROPOLITAN AREA MEDICAL CENTER 10 | Facility: MEDICAL CENTER | Age: OVER 89
End: 2025-08-11
Payer: MEDICARE

## 2025-08-11 DIAGNOSIS — R93.3 ABN FINDINGS-GI TRACT: ICD-10-CM

## 2025-08-11 DIAGNOSIS — R10.84 ABDOMINAL PAIN, GENERALIZED: ICD-10-CM

## 2025-08-11 PROCEDURE — 99232 SBSQ HOSP IP/OBS MODERATE 35: CPT | Performed by: INTERNAL MEDICINE

## 2025-08-12 ENCOUNTER — CLAIMS CREATED FROM THE CLAIM WINDOW (OUTPATIENT)
Dept: URBAN - METROPOLITAN AREA MEDICAL CENTER 10 | Facility: MEDICAL CENTER | Age: OVER 89
End: 2025-08-12
Payer: MEDICARE

## 2025-08-12 DIAGNOSIS — R93.3 ABN FINDINGS-GI TRACT: ICD-10-CM

## 2025-08-12 PROCEDURE — 99232 SBSQ HOSP IP/OBS MODERATE 35: CPT | Performed by: PHYSICIAN ASSISTANT

## 2025-08-14 ENCOUNTER — OFFICE VISIT (OUTPATIENT)
Dept: URBAN - METROPOLITAN AREA CLINIC 78 | Facility: CLINIC | Age: OVER 89
End: 2025-08-14